# Patient Record
Sex: FEMALE | Race: WHITE | Employment: UNEMPLOYED | URBAN - METROPOLITAN AREA
[De-identification: names, ages, dates, MRNs, and addresses within clinical notes are randomized per-mention and may not be internally consistent; named-entity substitution may affect disease eponyms.]

---

## 2017-05-02 ENCOUNTER — OFFICE VISIT (OUTPATIENT)
Dept: OBGYN CLINIC | Facility: CLINIC | Age: 57
End: 2017-05-02

## 2017-05-02 ENCOUNTER — TELEPHONE (OUTPATIENT)
Dept: OBGYN CLINIC | Facility: CLINIC | Age: 57
End: 2017-05-02

## 2017-05-02 VITALS
DIASTOLIC BLOOD PRESSURE: 76 MMHG | HEART RATE: 52 BPM | WEIGHT: 128.63 LBS | SYSTOLIC BLOOD PRESSURE: 120 MMHG | BODY MASS INDEX: 22 KG/M2

## 2017-05-02 DIAGNOSIS — Z12.31 VISIT FOR SCREENING MAMMOGRAM: ICD-10-CM

## 2017-05-02 DIAGNOSIS — Z01.419 ENCOUNTER FOR GYNECOLOGICAL EXAMINATION WITHOUT ABNORMAL FINDING: Primary | ICD-10-CM

## 2017-05-02 DIAGNOSIS — Z12.4 SCREENING FOR MALIGNANT NEOPLASM OF CERVIX: ICD-10-CM

## 2017-05-02 DIAGNOSIS — Z13.820 OSTEOPOROSIS SCREENING: Primary | ICD-10-CM

## 2017-05-02 PROCEDURE — 99396 PREV VISIT EST AGE 40-64: CPT | Performed by: OBSTETRICS & GYNECOLOGY

## 2017-05-02 NOTE — PROGRESS NOTES
Merlinda Orris is a 61year old female [id number] No LMP recorded. Patient is postmenopausal. who presents for Patient presents with:  Gyn Exam: Annual and Mammo order  She has no complaints.     OBSTETRICS HISTORY:  Obstetric History     T4    TAB Allergies      Review of Systems:  Constitutional:  Denies fatigue, night sweats, hot flashes  Eyes:  denies blurred or double vision  Cardiovascular:  denies chest pain or palpitations  Respiratory:  denies shortness of breath  Gastrointestinal:  denies h hemorroids    Assessment & Plan:   ASCCP guidelines discussed,cotest done,mammogram ordered,rtc 1 year for annual exam   Encounter for gynecological examination without abnormal finding  (primary encounter diagnosis)  Visit for screening mammogram  No orde

## 2017-05-04 ENCOUNTER — HOSPITAL ENCOUNTER (OUTPATIENT)
Dept: MAMMOGRAPHY | Age: 57
Discharge: HOME | End: 2017-05-04
Attending: OBSTETRICS & GYNECOLOGY
Payer: COMMERCIAL

## 2017-05-04 DIAGNOSIS — Z12.31 VISIT FOR SCREENING MAMMOGRAM: ICD-10-CM

## 2017-05-04 PROCEDURE — 77067 SCR MAMMO BI INCL CAD: CPT | Performed by: OBSTETRICS & GYNECOLOGY

## 2018-06-26 ENCOUNTER — OFFICE VISIT (OUTPATIENT)
Dept: OBGYN CLINIC | Facility: CLINIC | Age: 58
End: 2018-06-26

## 2018-06-26 VITALS
BODY MASS INDEX: 22 KG/M2 | SYSTOLIC BLOOD PRESSURE: 130 MMHG | DIASTOLIC BLOOD PRESSURE: 84 MMHG | HEART RATE: 62 BPM | WEIGHT: 126.19 LBS

## 2018-06-26 DIAGNOSIS — Z12.31 VISIT FOR SCREENING MAMMOGRAM: ICD-10-CM

## 2018-06-26 DIAGNOSIS — Z01.419 ENCOUNTER FOR GYNECOLOGICAL EXAMINATION WITHOUT ABNORMAL FINDING: Primary | ICD-10-CM

## 2018-06-26 PROCEDURE — 99396 PREV VISIT EST AGE 40-64: CPT | Performed by: OBSTETRICS & GYNECOLOGY

## 2018-06-26 RX ORDER — PYRIDOXINE HCL (VITAMIN B6) 100 MG
TABLET ORAL
COMMUNITY
End: 2020-11-03 | Stop reason: ALTCHOICE

## 2018-06-26 RX ORDER — MULTIVIT-MIN/IRON FUM/FOLIC AC 7.5 MG-4
1 TABLET ORAL DAILY
COMMUNITY

## 2018-06-26 RX ORDER — LEVETIRACETAM 500 MG/1
TABLET ORAL
COMMUNITY
End: 2018-06-26

## 2018-06-26 NOTE — PROGRESS NOTES
Roselyn Guzman is a 60year old female [id number] No LMP recorded. Patient is postmenopausal. who presents for Patient presents with:  Gyn Exam: Annual  She has no complaints.     OBSTETRICS HISTORY:  Obstetric History     T4    L4    SAB1  TAB0  E Oral Tab, Take by mouth., Disp: , Rfl:   •  levetiracetam (KEPPRA) 750 MG Oral Tab, , Disp: , Rfl: 4    ALLERGIES:  No Known Allergies      Review of Systems:  Constitutional:  Denies fatigue, night sweats, hot flashes  Eyes:  denies blurred or double visi normal without masses or tenderness  Perineum: normal  Rectovaginal: no masses, normal tone  Anus: no hemorroids    Assessment & Plan:   ASCCP guidelines discussed,cotest done [id number] in 3 years,mammogram ordered,rtc 1 year for annual exam  [place]

## 2018-06-27 ENCOUNTER — HOSPITAL ENCOUNTER (OUTPATIENT)
Dept: MAMMOGRAPHY | Age: 58
Discharge: HOME | End: 2018-06-27
Attending: OBSTETRICS & GYNECOLOGY
Payer: COMMERCIAL

## 2018-06-27 DIAGNOSIS — Z12.31 VISIT FOR SCREENING MAMMOGRAM: ICD-10-CM

## 2018-06-27 PROCEDURE — 77067 SCR MAMMO BI INCL CAD: CPT | Performed by: OBSTETRICS & GYNECOLOGY

## 2019-07-02 ENCOUNTER — OFFICE VISIT (OUTPATIENT)
Dept: OBGYN CLINIC | Facility: CLINIC | Age: 59
End: 2019-07-02
Payer: COMMERCIAL

## 2019-07-02 VITALS
HEART RATE: 110 BPM | DIASTOLIC BLOOD PRESSURE: 62 MMHG | BODY MASS INDEX: 20 KG/M2 | WEIGHT: 119 LBS | SYSTOLIC BLOOD PRESSURE: 93 MMHG

## 2019-07-02 DIAGNOSIS — Z12.31 VISIT FOR SCREENING MAMMOGRAM: ICD-10-CM

## 2019-07-02 DIAGNOSIS — Z01.419 ENCOUNTER FOR GYNECOLOGICAL EXAMINATION WITHOUT ABNORMAL FINDING: Primary | ICD-10-CM

## 2019-07-02 PROCEDURE — 99396 PREV VISIT EST AGE 40-64: CPT | Performed by: OBSTETRICS & GYNECOLOGY

## 2019-07-02 RX ORDER — ROSUVASTATIN CALCIUM 10 MG/1
TABLET, COATED ORAL
Refills: 1 | COMMUNITY
Start: 2019-04-30 | End: 2021-01-20 | Stop reason: ALTCHOICE

## 2019-07-02 NOTE — PROGRESS NOTES
Gilford Monica is a 60year old female [id number] No LMP recorded. Patient is postmenopausal. who presents for Patient presents with:  Gyn Exam: Annual.  She has no complaints.     OBSTETRICS HISTORY:  OB History     T4    L4    SAB1  TAB0  Ectopic session: Not on file      Stress: Not on file    Relationships      Social connections:        Talks on phone: Not on file        Gets together: Not on file        Attends [employer] service: Not on file        Active member of club or organization: Not on palpitations  Respiratory:  denies shortness of breath  Gastrointestinal:  denies heartburn, abdominal pain, diarrhea or constipation  Genitourinary:  denies dysuria, incontinence, abnormal vaginal discharge, vaginal itching  Musculoskeletal:  denies back years,mammogram ordered,rtc 1 year for annual exam  Encounter for gynecological examination without abnormal finding  (primary encounter diagnosis)  Visit for screening mammogram  No orders of the defined types were placed in this encounter.     Requested P

## 2019-07-13 ENCOUNTER — HOSPITAL ENCOUNTER (OUTPATIENT)
Dept: MAMMOGRAPHY | Age: 59
Discharge: HOME | End: 2019-07-13
Attending: OBSTETRICS & GYNECOLOGY
Payer: COMMERCIAL

## 2019-07-13 DIAGNOSIS — Z12.31 VISIT FOR SCREENING MAMMOGRAM: ICD-10-CM

## 2019-07-13 PROCEDURE — 77067 SCR MAMMO BI INCL CAD: CPT | Performed by: OBSTETRICS & GYNECOLOGY

## 2019-07-13 PROCEDURE — 77063 BREAST TOMOSYNTHESIS BI: CPT | Performed by: OBSTETRICS & GYNECOLOGY

## 2019-07-23 ENCOUNTER — LAB REQUISITION (OUTPATIENT)
Dept: LAB | Facility: HOSPITAL | Age: 59
End: 2019-07-23
Payer: COMMERCIAL

## 2019-07-23 DIAGNOSIS — Z00.00 ENCOUNTER FOR GENERAL ADULT MEDICAL EXAMINATION WITHOUT ABNORMAL FINDINGS: ICD-10-CM

## 2019-07-23 LAB — TSI SER-ACNC: 1.9 MIU/ML (ref 0.36–3.74)

## 2019-07-23 PROCEDURE — 84443 ASSAY THYROID STIM HORMONE: CPT | Performed by: INTERNAL MEDICINE

## 2020-11-03 ENCOUNTER — OFFICE VISIT (OUTPATIENT)
Dept: RHEUMATOLOGY | Facility: CLINIC | Age: 60
End: 2020-11-03
Payer: COMMERCIAL

## 2020-11-03 VITALS
SYSTOLIC BLOOD PRESSURE: 124 MMHG | HEIGHT: 64 IN | DIASTOLIC BLOOD PRESSURE: 79 MMHG | BODY MASS INDEX: 18.44 KG/M2 | HEART RATE: 85 BPM | WEIGHT: 108 LBS

## 2020-11-03 DIAGNOSIS — M05.79 RHEUMATOID ARTHRITIS INVOLVING MULTIPLE SITES WITH POSITIVE RHEUMATOID FACTOR (HCC): Primary | ICD-10-CM

## 2020-11-03 DIAGNOSIS — Z51.81 THERAPEUTIC DRUG MONITORING: ICD-10-CM

## 2020-11-03 PROCEDURE — 3008F BODY MASS INDEX DOCD: CPT | Performed by: INTERNAL MEDICINE

## 2020-11-03 PROCEDURE — 3078F DIAST BP <80 MM HG: CPT | Performed by: INTERNAL MEDICINE

## 2020-11-03 PROCEDURE — 99244 OFF/OP CNSLTJ NEW/EST MOD 40: CPT | Performed by: INTERNAL MEDICINE

## 2020-11-03 PROCEDURE — 3074F SYST BP LT 130 MM HG: CPT | Performed by: INTERNAL MEDICINE

## 2020-11-03 PROCEDURE — 99072 ADDL SUPL MATRL&STAF TM PHE: CPT | Performed by: INTERNAL MEDICINE

## 2020-11-03 RX ORDER — METHOTREXATE 2.5 MG/1
TABLET ORAL
Qty: 20 TABLET | Refills: 2 | Status: SHIPPED | OUTPATIENT
Start: 2020-11-03 | End: 2020-12-08

## 2020-11-03 RX ORDER — PREDNISONE 1 MG/1
TABLET ORAL
Qty: 90 TABLET | Refills: 0 | Status: SHIPPED | OUTPATIENT
Start: 2020-11-03 | End: 2020-12-08

## 2020-11-03 RX ORDER — FOLIC ACID 1 MG/1
TABLET ORAL
Qty: 90 TABLET | Refills: 3 | Status: SHIPPED | OUTPATIENT
Start: 2020-11-03 | End: 2021-10-18

## 2020-11-03 NOTE — PROGRESS NOTES
Dear Manuel Starkey:    I saw your patient Mathieu Persaud in consultation this afternoon at your request for evaluation of rheumatoid arthritis.   As you know, she is a 67-year-old woman who in June of 2020 developed diffuse polyarthritis, affecting the PIP and MCP jay ulcers, lymphadenopathy. No shortness of breath or chest pain. No acid reflux, stomach pain, nausea or vomiting, constipation or diarrhea, blood in her stools. No trouble urinating. No dry eyes or mouth. No Raynaud's. No headache.     Physical exam: you for inviting me to participate in her care. Sincerely,      Sameer Faulkner MD   Rheumatology.

## 2020-12-04 ENCOUNTER — LAB ENCOUNTER (OUTPATIENT)
Dept: LAB | Age: 60
End: 2020-12-04
Attending: INTERNAL MEDICINE
Payer: COMMERCIAL

## 2020-12-04 DIAGNOSIS — Z51.81 THERAPEUTIC DRUG MONITORING: ICD-10-CM

## 2020-12-04 DIAGNOSIS — M05.79 RHEUMATOID ARTHRITIS INVOLVING MULTIPLE SITES WITH POSITIVE RHEUMATOID FACTOR (HCC): ICD-10-CM

## 2020-12-04 PROCEDURE — 85652 RBC SED RATE AUTOMATED: CPT

## 2020-12-04 PROCEDURE — 82040 ASSAY OF SERUM ALBUMIN: CPT

## 2020-12-04 PROCEDURE — 82565 ASSAY OF CREATININE: CPT

## 2020-12-04 PROCEDURE — 86140 C-REACTIVE PROTEIN: CPT

## 2020-12-04 PROCEDURE — 84450 TRANSFERASE (AST) (SGOT): CPT

## 2020-12-04 PROCEDURE — 36415 COLL VENOUS BLD VENIPUNCTURE: CPT

## 2020-12-04 PROCEDURE — 85025 COMPLETE CBC W/AUTO DIFF WBC: CPT

## 2020-12-04 PROCEDURE — 85060 BLOOD SMEAR INTERPRETATION: CPT

## 2020-12-08 ENCOUNTER — OFFICE VISIT (OUTPATIENT)
Dept: RHEUMATOLOGY | Facility: CLINIC | Age: 60
End: 2020-12-08
Payer: COMMERCIAL

## 2020-12-08 VITALS
HEART RATE: 71 BPM | DIASTOLIC BLOOD PRESSURE: 82 MMHG | BODY MASS INDEX: 18.78 KG/M2 | HEIGHT: 64 IN | WEIGHT: 110 LBS | SYSTOLIC BLOOD PRESSURE: 124 MMHG

## 2020-12-08 DIAGNOSIS — M05.79 RHEUMATOID ARTHRITIS INVOLVING MULTIPLE SITES WITH POSITIVE RHEUMATOID FACTOR (HCC): Primary | ICD-10-CM

## 2020-12-08 DIAGNOSIS — Z51.81 THERAPEUTIC DRUG MONITORING: ICD-10-CM

## 2020-12-08 PROCEDURE — 3008F BODY MASS INDEX DOCD: CPT | Performed by: INTERNAL MEDICINE

## 2020-12-08 PROCEDURE — 99214 OFFICE O/P EST MOD 30 MIN: CPT | Performed by: INTERNAL MEDICINE

## 2020-12-08 PROCEDURE — 3074F SYST BP LT 130 MM HG: CPT | Performed by: INTERNAL MEDICINE

## 2020-12-08 PROCEDURE — 3079F DIAST BP 80-89 MM HG: CPT | Performed by: INTERNAL MEDICINE

## 2020-12-08 RX ORDER — PREDNISONE 1 MG/1
TABLET ORAL
Qty: 90 TABLET | Refills: 0 | COMMUNITY
Start: 2020-12-08 | End: 2021-01-20

## 2020-12-08 RX ORDER — METHOTREXATE 2.5 MG/1
TABLET ORAL
Qty: 30 TABLET | Refills: 2 | Status: SHIPPED | OUTPATIENT
Start: 2020-12-08 | End: 2021-01-20

## 2020-12-08 NOTE — PROGRESS NOTES
HPI:    Patient ID: Karen Boles is a 63year old female. Yessica Leyva is a 56-year-old patient of Dr. Andrew Terrazas, with CCP and RF positive rheumatoid arthritis.   Since I saw her November 3rd of 2020, she has been on prednisone and methotrexate 10 mg weekly Head: Normocephalic. Eyes: Conjunctivae are normal.   Cardiovascular: Normal rate, regular rhythm and normal heart sounds. Pulmonary/Chest: Effort normal and breath sounds normal.   Abdominal: Soft.  Bowel sounds are normal. There is no abdominal tend

## 2021-01-16 NOTE — PROGRESS NOTES
Sushma Bowman is a 70-year-old patient of Dr. Junior Krueger, with CCP and RF positive rheumatoid arthritis. Since I last saw her December 8th of 2020, she has been on prednisone and methotrexate 15 mg weekly. Her arthritis continues to improve.   There is some st person, place, and time. Skin: No rash noted. Psychiatric: She has a normal mood and affect. ASSESSMENT/PLAN:     1.  CCP and RF positive rheumatoid arthritis. Under much better control, but still mildly active.   Her methotrexate was increased

## 2021-01-19 ENCOUNTER — LAB ENCOUNTER (OUTPATIENT)
Dept: LAB | Age: 61
End: 2021-01-19
Attending: INTERNAL MEDICINE
Payer: COMMERCIAL

## 2021-01-19 DIAGNOSIS — Z51.81 THERAPEUTIC DRUG MONITORING: ICD-10-CM

## 2021-01-19 DIAGNOSIS — M05.79 RHEUMATOID ARTHRITIS INVOLVING MULTIPLE SITES WITH POSITIVE RHEUMATOID FACTOR (HCC): ICD-10-CM

## 2021-01-19 LAB
ALBUMIN SERPL-MCNC: 3.8 G/DL (ref 3.4–5)
AST SERPL-CCNC: 13 U/L (ref 15–37)
BASOPHILS # BLD AUTO: 0.04 X10(3) UL (ref 0–0.2)
BASOPHILS NFR BLD AUTO: 1 %
CREAT BLD-MCNC: 0.91 MG/DL
CRP SERPL-MCNC: <0.29 MG/DL (ref ?–0.3)
DEPRECATED RDW RBC AUTO: 58.1 FL (ref 35.1–46.3)
EOSINOPHIL # BLD AUTO: 0.14 X10(3) UL (ref 0–0.7)
EOSINOPHIL NFR BLD AUTO: 3.6 %
ERYTHROCYTE [DISTWIDTH] IN BLOOD BY AUTOMATED COUNT: 17 % (ref 11–15)
ERYTHROCYTE [SEDIMENTATION RATE] IN BLOOD: 30 MM/HR
HCT VFR BLD AUTO: 40.6 %
HGB BLD-MCNC: 13.2 G/DL
IMM GRANULOCYTES # BLD AUTO: 0.01 X10(3) UL (ref 0–1)
IMM GRANULOCYTES NFR BLD: 0.3 %
LYMPHOCYTES # BLD AUTO: 1.96 X10(3) UL (ref 1–4)
LYMPHOCYTES NFR BLD AUTO: 50 %
MCH RBC QN AUTO: 30.5 PG (ref 26–34)
MCHC RBC AUTO-ENTMCNC: 32.5 G/DL (ref 31–37)
MCV RBC AUTO: 93.8 FL
MONOCYTES # BLD AUTO: 0.83 X10(3) UL (ref 0.1–1)
MONOCYTES NFR BLD AUTO: 21.2 %
NEUTROPHILS # BLD AUTO: 0.94 X10 (3) UL (ref 1.5–7.7)
NEUTROPHILS # BLD AUTO: 0.94 X10(3) UL (ref 1.5–7.7)
NEUTROPHILS NFR BLD AUTO: 23.9 %
PLATELET # BLD AUTO: 273 10(3)UL (ref 150–450)
RBC # BLD AUTO: 4.33 X10(6)UL
WBC # BLD AUTO: 3.9 X10(3) UL (ref 4–11)

## 2021-01-19 PROCEDURE — 82040 ASSAY OF SERUM ALBUMIN: CPT

## 2021-01-19 PROCEDURE — 36415 COLL VENOUS BLD VENIPUNCTURE: CPT

## 2021-01-19 PROCEDURE — 86140 C-REACTIVE PROTEIN: CPT

## 2021-01-19 PROCEDURE — 84450 TRANSFERASE (AST) (SGOT): CPT

## 2021-01-19 PROCEDURE — 85025 COMPLETE CBC W/AUTO DIFF WBC: CPT

## 2021-01-19 PROCEDURE — 82565 ASSAY OF CREATININE: CPT

## 2021-01-19 PROCEDURE — 85652 RBC SED RATE AUTOMATED: CPT

## 2021-01-20 ENCOUNTER — OFFICE VISIT (OUTPATIENT)
Dept: RHEUMATOLOGY | Facility: CLINIC | Age: 61
End: 2021-01-20
Payer: COMMERCIAL

## 2021-01-20 VITALS
BODY MASS INDEX: 19.46 KG/M2 | HEART RATE: 76 BPM | HEIGHT: 64 IN | WEIGHT: 114 LBS | SYSTOLIC BLOOD PRESSURE: 118 MMHG | DIASTOLIC BLOOD PRESSURE: 72 MMHG

## 2021-01-20 DIAGNOSIS — Z51.81 THERAPEUTIC DRUG MONITORING: ICD-10-CM

## 2021-01-20 DIAGNOSIS — M05.79 RHEUMATOID ARTHRITIS INVOLVING MULTIPLE SITES WITH POSITIVE RHEUMATOID FACTOR (HCC): Primary | ICD-10-CM

## 2021-01-20 PROCEDURE — 3078F DIAST BP <80 MM HG: CPT | Performed by: INTERNAL MEDICINE

## 2021-01-20 PROCEDURE — 99214 OFFICE O/P EST MOD 30 MIN: CPT | Performed by: INTERNAL MEDICINE

## 2021-01-20 PROCEDURE — 3008F BODY MASS INDEX DOCD: CPT | Performed by: INTERNAL MEDICINE

## 2021-01-20 PROCEDURE — 3074F SYST BP LT 130 MM HG: CPT | Performed by: INTERNAL MEDICINE

## 2021-01-20 RX ORDER — METHOTREXATE 2.5 MG/1
TABLET ORAL
Qty: 104 TABLET | Refills: 1 | Status: SHIPPED | OUTPATIENT
Start: 2021-01-20 | End: 2021-08-05

## 2021-01-20 RX ORDER — METHOTREXATE 2.5 MG/1
TABLET ORAL
Qty: 104 TABLET | Refills: 1 | Status: SHIPPED | OUTPATIENT
Start: 2021-01-20 | End: 2021-01-20

## 2021-01-20 RX ORDER — PREDNISONE 2.5 MG
TABLET ORAL
Qty: 90 TABLET | Refills: 1 | Status: SHIPPED | OUTPATIENT
Start: 2021-01-20 | End: 2021-07-19

## 2021-02-27 NOTE — PROGRESS NOTES
Sushma Bowman is a 79-year-old patient of Dr. Junior Krueger, with CCP and RF positive rheumatoid arthritis. Since I last saw her January 20th of 2021, she has been on prednisone 2.5 mg weekly, and methotrexate 15 mg weekly. Her arthritis remains much improved. Psychiatric: She has a normal mood and affect. ASSESSMENT/PLAN:     1.  CCP and RF positive rheumatoid arthritis. Under good control. She will continue methotrexate 20 mg weekly. She will remain on 2.5 mg of prednisone every morning.   She will

## 2021-03-01 ENCOUNTER — LAB ENCOUNTER (OUTPATIENT)
Dept: LAB | Age: 61
End: 2021-03-01
Attending: INTERNAL MEDICINE
Payer: COMMERCIAL

## 2021-03-01 DIAGNOSIS — M05.79 RHEUMATOID ARTHRITIS INVOLVING MULTIPLE SITES WITH POSITIVE RHEUMATOID FACTOR (HCC): ICD-10-CM

## 2021-03-01 DIAGNOSIS — Z51.81 THERAPEUTIC DRUG MONITORING: ICD-10-CM

## 2021-03-01 LAB
ALBUMIN SERPL-MCNC: 3.7 G/DL (ref 3.4–5)
AST SERPL-CCNC: 10 U/L (ref 15–37)
BASOPHILS # BLD AUTO: 0.04 X10(3) UL (ref 0–0.2)
BASOPHILS NFR BLD AUTO: 1.1 %
CREAT BLD-MCNC: 0.89 MG/DL
CRP SERPL-MCNC: <0.29 MG/DL (ref ?–0.3)
DEPRECATED RDW RBC AUTO: 52.1 FL (ref 35.1–46.3)
EOSINOPHIL # BLD AUTO: 0.09 X10(3) UL (ref 0–0.7)
EOSINOPHIL NFR BLD AUTO: 2.5 %
ERYTHROCYTE [DISTWIDTH] IN BLOOD BY AUTOMATED COUNT: 14.9 % (ref 11–15)
ERYTHROCYTE [SEDIMENTATION RATE] IN BLOOD: 16 MM/HR
HCT VFR BLD AUTO: 37.2 %
HGB BLD-MCNC: 12.4 G/DL
IMM GRANULOCYTES # BLD AUTO: 0.01 X10(3) UL (ref 0–1)
IMM GRANULOCYTES NFR BLD: 0.3 %
LYMPHOCYTES # BLD AUTO: 1.58 X10(3) UL (ref 1–4)
LYMPHOCYTES NFR BLD AUTO: 44.3 %
MCH RBC QN AUTO: 32.5 PG (ref 26–34)
MCHC RBC AUTO-ENTMCNC: 33.3 G/DL (ref 31–37)
MCV RBC AUTO: 97.6 FL
MONOCYTES # BLD AUTO: 0.69 X10(3) UL (ref 0.1–1)
MONOCYTES NFR BLD AUTO: 19.3 %
NEUTROPHILS # BLD AUTO: 1.16 X10 (3) UL (ref 1.5–7.7)
NEUTROPHILS # BLD AUTO: 1.16 X10(3) UL (ref 1.5–7.7)
NEUTROPHILS NFR BLD AUTO: 32.5 %
PLATELET # BLD AUTO: 256 10(3)UL (ref 150–450)
RBC # BLD AUTO: 3.81 X10(6)UL
WBC # BLD AUTO: 3.6 X10(3) UL (ref 4–11)

## 2021-03-01 PROCEDURE — 36415 COLL VENOUS BLD VENIPUNCTURE: CPT

## 2021-03-01 PROCEDURE — 85025 COMPLETE CBC W/AUTO DIFF WBC: CPT

## 2021-03-01 PROCEDURE — 84450 TRANSFERASE (AST) (SGOT): CPT

## 2021-03-01 PROCEDURE — 86140 C-REACTIVE PROTEIN: CPT

## 2021-03-01 PROCEDURE — 85652 RBC SED RATE AUTOMATED: CPT

## 2021-03-01 PROCEDURE — 82040 ASSAY OF SERUM ALBUMIN: CPT

## 2021-03-01 PROCEDURE — 82565 ASSAY OF CREATININE: CPT

## 2021-03-02 ENCOUNTER — OFFICE VISIT (OUTPATIENT)
Dept: RHEUMATOLOGY | Facility: CLINIC | Age: 61
End: 2021-03-02
Payer: COMMERCIAL

## 2021-03-02 VITALS
SYSTOLIC BLOOD PRESSURE: 109 MMHG | HEIGHT: 64 IN | BODY MASS INDEX: 19.81 KG/M2 | HEART RATE: 80 BPM | DIASTOLIC BLOOD PRESSURE: 73 MMHG | WEIGHT: 116 LBS

## 2021-03-02 DIAGNOSIS — Z51.81 THERAPEUTIC DRUG MONITORING: ICD-10-CM

## 2021-03-02 DIAGNOSIS — M05.79 RHEUMATOID ARTHRITIS INVOLVING MULTIPLE SITES WITH POSITIVE RHEUMATOID FACTOR (HCC): Primary | ICD-10-CM

## 2021-03-02 PROCEDURE — 3074F SYST BP LT 130 MM HG: CPT | Performed by: INTERNAL MEDICINE

## 2021-03-02 PROCEDURE — 3008F BODY MASS INDEX DOCD: CPT | Performed by: INTERNAL MEDICINE

## 2021-03-02 PROCEDURE — 99214 OFFICE O/P EST MOD 30 MIN: CPT | Performed by: INTERNAL MEDICINE

## 2021-03-02 PROCEDURE — 3078F DIAST BP <80 MM HG: CPT | Performed by: INTERNAL MEDICINE

## 2021-05-29 NOTE — PROGRESS NOTES
Clifford Youngblood is a 19-year-old patient of Dr. Dhruv Cuellar, with CCP and RF positive rheumatoid arthritis. Since I last saw her March 2nd of 2021, she has continued prednisone 2.5 mg weekly, and methotrexate 20 mg weekly. Her arthritis remains much improved.   Saulo Che She will continue methotrexate 20 mg weekly, and 2.5 mg of prednisone every morning. She will return in 3 months, after repeat monitoring tests. 2.  Seizure disorder, doing well on Keppra. 3.  Status post benign brain tumor removed in 2011.

## 2021-06-01 ENCOUNTER — LAB ENCOUNTER (OUTPATIENT)
Dept: LAB | Age: 61
End: 2021-06-01
Attending: INTERNAL MEDICINE
Payer: COMMERCIAL

## 2021-06-01 DIAGNOSIS — M05.79 RHEUMATOID ARTHRITIS INVOLVING MULTIPLE SITES WITH POSITIVE RHEUMATOID FACTOR (HCC): ICD-10-CM

## 2021-06-01 DIAGNOSIS — Z51.81 THERAPEUTIC DRUG MONITORING: ICD-10-CM

## 2021-06-01 PROCEDURE — 86140 C-REACTIVE PROTEIN: CPT

## 2021-06-01 PROCEDURE — 85025 COMPLETE CBC W/AUTO DIFF WBC: CPT

## 2021-06-01 PROCEDURE — 82040 ASSAY OF SERUM ALBUMIN: CPT

## 2021-06-01 PROCEDURE — 82565 ASSAY OF CREATININE: CPT

## 2021-06-01 PROCEDURE — 85652 RBC SED RATE AUTOMATED: CPT

## 2021-06-01 PROCEDURE — 36415 COLL VENOUS BLD VENIPUNCTURE: CPT

## 2021-06-01 PROCEDURE — 84450 TRANSFERASE (AST) (SGOT): CPT

## 2021-06-02 ENCOUNTER — OFFICE VISIT (OUTPATIENT)
Dept: RHEUMATOLOGY | Facility: CLINIC | Age: 61
End: 2021-06-02
Payer: COMMERCIAL

## 2021-06-02 VITALS
HEIGHT: 64 IN | WEIGHT: 117 LBS | SYSTOLIC BLOOD PRESSURE: 112 MMHG | BODY MASS INDEX: 19.97 KG/M2 | DIASTOLIC BLOOD PRESSURE: 75 MMHG | HEART RATE: 69 BPM

## 2021-06-02 DIAGNOSIS — Z51.81 THERAPEUTIC DRUG MONITORING: ICD-10-CM

## 2021-06-02 DIAGNOSIS — M05.79 RHEUMATOID ARTHRITIS INVOLVING MULTIPLE SITES WITH POSITIVE RHEUMATOID FACTOR (HCC): Primary | ICD-10-CM

## 2021-06-02 PROCEDURE — 3008F BODY MASS INDEX DOCD: CPT | Performed by: INTERNAL MEDICINE

## 2021-06-02 PROCEDURE — 99214 OFFICE O/P EST MOD 30 MIN: CPT | Performed by: INTERNAL MEDICINE

## 2021-06-02 PROCEDURE — 3074F SYST BP LT 130 MM HG: CPT | Performed by: INTERNAL MEDICINE

## 2021-06-02 PROCEDURE — 3078F DIAST BP <80 MM HG: CPT | Performed by: INTERNAL MEDICINE

## 2021-07-19 RX ORDER — PREDNISONE 2.5 MG
TABLET ORAL
Qty: 90 TABLET | Refills: 1 | Status: SHIPPED | OUTPATIENT
Start: 2021-07-19 | End: 2021-12-07

## 2021-07-19 NOTE — TELEPHONE ENCOUNTER
Last filled: 1/20/21 #90 tab with 1 refill   LOV: 6/2/21  Future Appointments   Date Time Provider Jenifer Regan   9/7/2021 11:40 AM Yudith Zhang MD [facility] [place] [facility] [place]   9/14/2021  3:00 PM Vivek Jackson [facility]

## 2021-08-05 RX ORDER — METHOTREXATE 2.5 MG/1
TABLET ORAL
Qty: 104 TABLET | Refills: 1 | Status: SHIPPED | OUTPATIENT
Start: 2021-08-05 | End: 2022-02-07

## 2021-08-05 NOTE — TELEPHONE ENCOUNTER
LOV: 6/2/21  Future Appointments   Date Time Provider Jenifer Shereen   9/7/2021 11:40 AM Cary Alcaraz MD [facility] [place] [facility] [place]   9/14/2021  3:00 PM Trupti Kennedy MD [facility] [address] ALEXIS LOONEY  [place]    LABS:  Component      Latest Ref Rng & Units 6/1/2021

## 2021-09-02 NOTE — PROGRESS NOTES
Army Carbajal is a 59-year-old patient of Dr. Cosme Doherty, with CCP and RF positive rheumatoid arthritis. Since I last saw her June 2nd of 2021, she has continued prednisone 2.5 mg daily, and methotrexate 20 mg weekly. Her arthritis remains much improved.   The She will continue methotrexate 20 mg weekly, and 2.5 mg of prednisone every morning. She will return in 3 months, after repeat monitoring tests. Her standing lab order was reproduced. 2.  Seizure disorder, doing well on Keppra.     3.  Status post tyler

## 2021-09-03 ENCOUNTER — LAB ENCOUNTER (OUTPATIENT)
Dept: LAB | Age: 61
End: 2021-09-03
Attending: INTERNAL MEDICINE
Payer: COMMERCIAL

## 2021-09-03 DIAGNOSIS — Z51.81 THERAPEUTIC DRUG MONITORING: ICD-10-CM

## 2021-09-03 DIAGNOSIS — M05.79 RHEUMATOID ARTHRITIS INVOLVING MULTIPLE SITES WITH POSITIVE RHEUMATOID FACTOR (HCC): ICD-10-CM

## 2021-09-03 LAB
ALBUMIN SERPL-MCNC: 3.8 G/DL (ref 3.4–5)
AST SERPL-CCNC: 13 U/L (ref 15–37)
BASOPHILS # BLD AUTO: 0.06 X10(3) UL (ref 0–0.2)
BASOPHILS NFR BLD AUTO: 1.3 %
CREAT BLD-MCNC: 0.78 MG/DL
CRP SERPL-MCNC: <0.29 MG/DL (ref ?–0.3)
DEPRECATED RDW RBC AUTO: 47.5 FL (ref 35.1–46.3)
EOSINOPHIL # BLD AUTO: 0.13 X10(3) UL (ref 0–0.7)
EOSINOPHIL NFR BLD AUTO: 2.8 %
ERYTHROCYTE [DISTWIDTH] IN BLOOD BY AUTOMATED COUNT: 13.3 % (ref 11–15)
ERYTHROCYTE [SEDIMENTATION RATE] IN BLOOD: 19 MM/HR
HCT VFR BLD AUTO: 39.8 %
HGB BLD-MCNC: 13.5 G/DL
IMM GRANULOCYTES # BLD AUTO: 0.01 X10(3) UL (ref 0–1)
IMM GRANULOCYTES NFR BLD: 0.2 %
LYMPHOCYTES # BLD AUTO: 1.45 X10(3) UL (ref 1–4)
LYMPHOCYTES NFR BLD AUTO: 30.9 %
MCH RBC QN AUTO: 33.2 PG (ref 26–34)
MCHC RBC AUTO-ENTMCNC: 33.9 G/DL (ref 31–37)
MCV RBC AUTO: 97.8 FL
MONOCYTES # BLD AUTO: 0.48 X10(3) UL (ref 0.1–1)
MONOCYTES NFR BLD AUTO: 10.2 %
NEUTROPHILS # BLD AUTO: 2.57 X10 (3) UL (ref 1.5–7.7)
NEUTROPHILS # BLD AUTO: 2.57 X10(3) UL (ref 1.5–7.7)
NEUTROPHILS NFR BLD AUTO: 54.6 %
PLATELET # BLD AUTO: 289 10(3)UL (ref 150–450)
RBC # BLD AUTO: 4.07 X10(6)UL
WBC # BLD AUTO: 4.7 X10(3) UL (ref 4–11)

## 2021-09-03 PROCEDURE — 82040 ASSAY OF SERUM ALBUMIN: CPT

## 2021-09-03 PROCEDURE — 82565 ASSAY OF CREATININE: CPT

## 2021-09-03 PROCEDURE — 85025 COMPLETE CBC W/AUTO DIFF WBC: CPT

## 2021-09-03 PROCEDURE — 36415 COLL VENOUS BLD VENIPUNCTURE: CPT

## 2021-09-03 PROCEDURE — 85652 RBC SED RATE AUTOMATED: CPT

## 2021-09-03 PROCEDURE — 86140 C-REACTIVE PROTEIN: CPT

## 2021-09-03 PROCEDURE — 84450 TRANSFERASE (AST) (SGOT): CPT

## 2021-09-07 ENCOUNTER — OFFICE VISIT (OUTPATIENT)
Dept: RHEUMATOLOGY | Facility: CLINIC | Age: 61
End: 2021-09-07
Payer: COMMERCIAL

## 2021-09-07 VITALS
DIASTOLIC BLOOD PRESSURE: 80 MMHG | BODY MASS INDEX: 19.63 KG/M2 | WEIGHT: 115 LBS | SYSTOLIC BLOOD PRESSURE: 128 MMHG | HEART RATE: 56 BPM | HEIGHT: 64 IN

## 2021-09-07 DIAGNOSIS — Z51.81 THERAPEUTIC DRUG MONITORING: ICD-10-CM

## 2021-09-07 DIAGNOSIS — M05.79 RHEUMATOID ARTHRITIS INVOLVING MULTIPLE SITES WITH POSITIVE RHEUMATOID FACTOR (HCC): Primary | ICD-10-CM

## 2021-09-07 PROCEDURE — 3008F BODY MASS INDEX DOCD: CPT | Performed by: INTERNAL MEDICINE

## 2021-09-07 PROCEDURE — 3079F DIAST BP 80-89 MM HG: CPT | Performed by: INTERNAL MEDICINE

## 2021-09-07 PROCEDURE — 99214 OFFICE O/P EST MOD 30 MIN: CPT | Performed by: INTERNAL MEDICINE

## 2021-09-07 PROCEDURE — 3074F SYST BP LT 130 MM HG: CPT | Performed by: INTERNAL MEDICINE

## 2021-09-14 ENCOUNTER — OFFICE VISIT (OUTPATIENT)
Dept: OBGYN CLINIC | Facility: CLINIC | Age: 61
End: 2021-09-14
Payer: COMMERCIAL

## 2021-09-14 VITALS
HEIGHT: 63.7 IN | DIASTOLIC BLOOD PRESSURE: 81 MMHG | HEART RATE: 76 BPM | SYSTOLIC BLOOD PRESSURE: 125 MMHG | WEIGHT: 112 LBS | BODY MASS INDEX: 19.36 KG/M2

## 2021-09-14 DIAGNOSIS — N94.89 VULVAR BURNING: ICD-10-CM

## 2021-09-14 DIAGNOSIS — Z01.419 ENCOUNTER FOR GYNECOLOGICAL EXAMINATION WITHOUT ABNORMAL FINDING: Primary | ICD-10-CM

## 2021-09-14 DIAGNOSIS — Z12.31 VISIT FOR SCREENING MAMMOGRAM: ICD-10-CM

## 2021-09-14 PROCEDURE — 99212 OFFICE O/P EST SF 10 MIN: CPT | Performed by: OBSTETRICS & GYNECOLOGY

## 2021-09-14 PROCEDURE — 3079F DIAST BP 80-89 MM HG: CPT | Performed by: OBSTETRICS & GYNECOLOGY

## 2021-09-14 PROCEDURE — 99396 PREV VISIT EST AGE 40-64: CPT | Performed by: OBSTETRICS & GYNECOLOGY

## 2021-09-14 PROCEDURE — 3008F BODY MASS INDEX DOCD: CPT | Performed by: OBSTETRICS & GYNECOLOGY

## 2021-09-14 PROCEDURE — 3074F SYST BP LT 130 MM HG: CPT | Performed by: OBSTETRICS & GYNECOLOGY

## 2021-09-14 NOTE — PROGRESS NOTES
Jesús Kirby is a 57year old female [id number] No LMP recorded.  Patient is postmenopausal. who presents for Patient presents with:  Gyn Exam: ANNUAL EXAM    She has a vulvar burning sensation that she believes was brought on by eating spicy food this past Liver, cause of death   • Diabetes Sister        MEDICATIONS:    Current Outpatient Medications:   •  Methotrexate Sodium 2.5 MG Oral Tab, Take 8 tablets every Tuesday with dinner., Disp: 104 tablet, Rfl: 1  •  predniSONE 2.5 MG Oral Tab, TAKE 1 TABLET BY auscultation bilaterally  Cardiovascular: regular rate and rhythm, no significant murmur  Abdomen:  soft, nontender, nondistended, no masses  Skin/Hair: no unusual rashes or bruises  Extremities: no edema, no cyanosis  Psychiatric:  Oriented to time, place

## 2021-09-15 LAB — HPV I/H RISK 1 DNA SPEC QL NAA+PROBE: NEGATIVE

## 2021-09-17 LAB — LAST PAP RESULT: NORMAL

## 2021-10-18 RX ORDER — FOLIC ACID 1 MG/1
TABLET ORAL
Qty: 90 TABLET | Refills: 3 | Status: SHIPPED | OUTPATIENT
Start: 2021-10-18

## 2021-10-18 NOTE — TELEPHONE ENCOUNTER
Requested Prescriptions     Pending Prescriptions Disp Refills   • folic acid 1 MG Oral Tab [Pharmacy Med Name: FOLIC ACID 1MG TABLETS] 90 tablet 3     Sig: TAKE 1 TABLET BY MOUTH DAILY     LF: 11/3/20 #90 TAB W/ 3 RF  LOV: 9/7/21   Future Appointments   D

## 2021-12-06 ENCOUNTER — LAB ENCOUNTER (OUTPATIENT)
Dept: LAB | Age: 61
End: 2021-12-06
Attending: INTERNAL MEDICINE
Payer: COMMERCIAL

## 2021-12-06 DIAGNOSIS — M05.79 RHEUMATOID ARTHRITIS INVOLVING MULTIPLE SITES WITH POSITIVE RHEUMATOID FACTOR (HCC): ICD-10-CM

## 2021-12-06 DIAGNOSIS — Z51.81 THERAPEUTIC DRUG MONITORING: ICD-10-CM

## 2021-12-06 PROCEDURE — 82565 ASSAY OF CREATININE: CPT

## 2021-12-06 PROCEDURE — 36415 COLL VENOUS BLD VENIPUNCTURE: CPT

## 2021-12-06 PROCEDURE — 85652 RBC SED RATE AUTOMATED: CPT

## 2021-12-06 PROCEDURE — 82040 ASSAY OF SERUM ALBUMIN: CPT

## 2021-12-06 PROCEDURE — 86140 C-REACTIVE PROTEIN: CPT

## 2021-12-06 PROCEDURE — 85025 COMPLETE CBC W/AUTO DIFF WBC: CPT

## 2021-12-06 PROCEDURE — 84450 TRANSFERASE (AST) (SGOT): CPT

## 2021-12-06 NOTE — PROGRESS NOTES
Army Carbajal is a 43-year-old patient of Dr. oCsme Doherty, with CCP and RF positive rheumatoid arthritis. Since I last saw her September 7th of 2021, she has continued prednisone 2.5 mg daily, and methotrexate 20 mg weekly. Her arthritis remains much improved. CCP and RF positive rheumatoid arthritis. Under good control. She will continue methotrexate 20 mg weekly, and drop to 2 mg of prednisone every morning. She will return in 3 months, after repeat monitoring tests.     2.  Seizure disorder, doing well on K

## 2021-12-07 ENCOUNTER — OFFICE VISIT (OUTPATIENT)
Dept: RHEUMATOLOGY | Facility: CLINIC | Age: 61
End: 2021-12-07
Payer: COMMERCIAL

## 2021-12-07 VITALS
DIASTOLIC BLOOD PRESSURE: 87 MMHG | SYSTOLIC BLOOD PRESSURE: 132 MMHG | BODY MASS INDEX: 19.81 KG/M2 | HEART RATE: 102 BPM | WEIGHT: 116 LBS | HEIGHT: 64 IN

## 2021-12-07 DIAGNOSIS — M05.79 RHEUMATOID ARTHRITIS INVOLVING MULTIPLE SITES WITH POSITIVE RHEUMATOID FACTOR (HCC): Primary | ICD-10-CM

## 2021-12-07 DIAGNOSIS — Z51.81 THERAPEUTIC DRUG MONITORING: ICD-10-CM

## 2021-12-07 PROCEDURE — 99214 OFFICE O/P EST MOD 30 MIN: CPT | Performed by: INTERNAL MEDICINE

## 2021-12-07 PROCEDURE — 3079F DIAST BP 80-89 MM HG: CPT | Performed by: INTERNAL MEDICINE

## 2021-12-07 PROCEDURE — 3008F BODY MASS INDEX DOCD: CPT | Performed by: INTERNAL MEDICINE

## 2021-12-07 PROCEDURE — 3075F SYST BP GE 130 - 139MM HG: CPT | Performed by: INTERNAL MEDICINE

## 2021-12-07 RX ORDER — PREDNISONE 1 MG/1
TABLET ORAL
Qty: 180 TABLET | Refills: 1 | Status: SHIPPED | OUTPATIENT
Start: 2021-12-07

## 2022-02-07 RX ORDER — METHOTREXATE 2.5 MG/1
TABLET ORAL
Qty: 104 TABLET | Refills: 1 | Status: SHIPPED | OUTPATIENT
Start: 2022-02-07

## 2022-02-07 NOTE — TELEPHONE ENCOUNTER
Future Appointments   Date Time Provider Jenifer Regan   3/15/2022 11:20 AM Zayda Pruett MD [facility] [place] [facility] [place]

## 2022-03-14 ENCOUNTER — LAB ENCOUNTER (OUTPATIENT)
Dept: LAB | Age: 62
End: 2022-03-14
Attending: INTERNAL MEDICINE
Payer: COMMERCIAL

## 2022-03-14 DIAGNOSIS — M05.79 RHEUMATOID ARTHRITIS INVOLVING MULTIPLE SITES WITH POSITIVE RHEUMATOID FACTOR (HCC): ICD-10-CM

## 2022-03-14 DIAGNOSIS — Z51.81 THERAPEUTIC DRUG MONITORING: ICD-10-CM

## 2022-03-14 LAB
ALBUMIN SERPL-MCNC: 4.1 G/DL (ref 3.4–5)
AST SERPL-CCNC: 17 U/L (ref 15–37)
BASOPHILS # BLD AUTO: 0.09 X10(3) UL (ref 0–0.2)
BASOPHILS NFR BLD AUTO: 1.4 %
CREAT BLD-MCNC: 0.89 MG/DL
CRP SERPL-MCNC: <0.29 MG/DL (ref ?–0.3)
DEPRECATED RDW RBC AUTO: 50.1 FL (ref 35.1–46.3)
EOSINOPHIL # BLD AUTO: 0.18 X10(3) UL (ref 0–0.7)
EOSINOPHIL NFR BLD AUTO: 2.8 %
ERYTHROCYTE [DISTWIDTH] IN BLOOD BY AUTOMATED COUNT: 13.9 % (ref 11–15)
ERYTHROCYTE [SEDIMENTATION RATE] IN BLOOD: 14 MM/HR
HCT VFR BLD AUTO: 42.5 %
HGB BLD-MCNC: 14.2 G/DL
IMM GRANULOCYTES # BLD AUTO: 0.01 X10(3) UL (ref 0–1)
LYMPHOCYTES # BLD AUTO: 2.01 X10(3) UL (ref 1–4)
LYMPHOCYTES NFR BLD AUTO: 31.5 %
MCH RBC QN AUTO: 33.3 PG (ref 26–34)
MCHC RBC AUTO-ENTMCNC: 33.4 G/DL (ref 31–37)
MONOCYTES # BLD AUTO: 0.64 X10(3) UL (ref 0.1–1)
MONOCYTES NFR BLD AUTO: 10 %
NEUTROPHILS # BLD AUTO: 3.46 X10 (3) UL (ref 1.5–7.7)
NEUTROPHILS # BLD AUTO: 3.46 X10(3) UL (ref 1.5–7.7)
NEUTROPHILS NFR BLD AUTO: 54.1 %
PLATELET # BLD AUTO: 328 10(3)UL (ref 150–450)
RBC # BLD AUTO: 4.27 X10(6)UL
WBC # BLD AUTO: 6.4 X10(3) UL (ref 4–11)

## 2022-03-14 PROCEDURE — 85652 RBC SED RATE AUTOMATED: CPT

## 2022-03-14 PROCEDURE — 82040 ASSAY OF SERUM ALBUMIN: CPT

## 2022-03-14 PROCEDURE — 82565 ASSAY OF CREATININE: CPT

## 2022-03-14 PROCEDURE — 84450 TRANSFERASE (AST) (SGOT): CPT

## 2022-03-14 PROCEDURE — 85025 COMPLETE CBC W/AUTO DIFF WBC: CPT

## 2022-03-14 PROCEDURE — 36415 COLL VENOUS BLD VENIPUNCTURE: CPT

## 2022-03-14 PROCEDURE — 86140 C-REACTIVE PROTEIN: CPT

## 2022-03-14 NOTE — PROGRESS NOTES
Steve Araujo is a 79-year-old patient of Dr. Sharyon Alpers, with CCP and RF positive rheumatoid arthritis. Since I last saw her December 7th of 2021, she has been on prednisone 2 mg daily, and methotrexate 20 mg weekly. Her arthritis is doing great. There is minimal stiffness and soreness across her hands in the morning. It takes several minutes to loosen. No joint swelling in the AM.    She doesn't have stomach upset or fatigue after taking methotrexate each week. She denies new medical or surgical problems since I saw her. No infections. Monitoring labs were repeated March 14th of 2022. CBC, creatinine, AST, albumin, sed rate of 14, and C-reactive protein of less than 0.29 were normal.    Review of Systems   Constitutional: Negative for fatigue and fever. Respiratory: Negative for shortness of breath. Cardiovascular: Negative for chest pain. Gastrointestinal: Negative for abdominal pain. Skin: Negative for rash. Hematological: Negative for adenopathy. Allergies:No Known Allergies    PHYSICAL EXAM:   Blood pressure 114/73, pulse 86, height 5' 4\" (1.626 m), weight 116 lb (52.6 kg), not currently breastfeeding. Constitutional: She is oriented to person, place, and time. She appears well-developed. HENT:   Head: Normocephalic. Eyes: Conjunctivae are normal.   Cardiovascular: Normal rate, regular rhythm and normal heart sounds. Pulmonary/Chest: Effort normal and breath sounds normal.   Abdominal: Soft. Bowel sounds are normal. There is no abdominal tenderness. Musculoskeletal:         General: No edema. Comments: No swelling across her second and third finger MCP and PIP joints.  strength is good bilaterally. Lymphadenopathy:     She has no cervical adenopathy. Neurological: She is alert and oriented to person, place, and time. Skin: No rash noted. Psychiatric: She has a normal mood and affect. ASSESSMENT/PLAN:     1.  CCP and RF positive rheumatoid arthritis. Under good control. She will continue methotrexate 20 mg weekly, and drop to 1 mg of prednisone every morning. She will return in 3 months, after repeat monitoring tests. 2.  Seizure disorder, doing well on Keppra. 3.  Status post benign brain tumor removed in 2011.

## 2022-03-15 ENCOUNTER — OFFICE VISIT (OUTPATIENT)
Dept: RHEUMATOLOGY | Facility: CLINIC | Age: 62
End: 2022-03-15
Payer: COMMERCIAL

## 2022-03-15 VITALS
WEIGHT: 116 LBS | HEIGHT: 64 IN | HEART RATE: 86 BPM | BODY MASS INDEX: 19.81 KG/M2 | SYSTOLIC BLOOD PRESSURE: 114 MMHG | DIASTOLIC BLOOD PRESSURE: 73 MMHG

## 2022-03-15 DIAGNOSIS — M05.79 RHEUMATOID ARTHRITIS INVOLVING MULTIPLE SITES WITH POSITIVE RHEUMATOID FACTOR (HCC): Primary | ICD-10-CM

## 2022-03-15 DIAGNOSIS — Z51.81 THERAPEUTIC DRUG MONITORING: ICD-10-CM

## 2022-03-15 PROCEDURE — 3074F SYST BP LT 130 MM HG: CPT | Performed by: INTERNAL MEDICINE

## 2022-03-15 PROCEDURE — 3078F DIAST BP <80 MM HG: CPT | Performed by: INTERNAL MEDICINE

## 2022-03-15 PROCEDURE — 99214 OFFICE O/P EST MOD 30 MIN: CPT | Performed by: INTERNAL MEDICINE

## 2022-03-15 PROCEDURE — 3008F BODY MASS INDEX DOCD: CPT | Performed by: INTERNAL MEDICINE

## 2022-03-15 RX ORDER — PREDNISONE 1 MG/1
TABLET ORAL
Qty: 90 TABLET | Refills: 1 | COMMUNITY
Start: 2022-03-15

## 2022-06-09 NOTE — PROGRESS NOTES
Terra Rader is a 58-year-old patient of Dr. Rafaela Bonner, with CCP and RF positive rheumatoid arthritis. After I last saw her March 15th, 2022, she came down to 1 mg of prednisone every morning. She did fine for several weeks, but then had a flare of swelling across her knees, the balls of her feet, and her hands. She went back to 2 mg of prednisone daily, and has done okay since. She has had left lower back pain going down the back of her leg. She has continued methotrexate 20 mg weekly. Her arthritis is doing well. There is minimal stiffness and soreness across her hands in the morning. It takes several minutes to loosen. No joint swelling in the AM.    She doesn't have stomach upset or fatigue after taking methotrexate each week. She denies new medical or surgical problems since I saw her. No infections. Monitoring labs were repeated June 13th of 2022. CBC, creatinine, AST, albumin, sed rate of 13, and C-reactive protein of less than 0.29 are normal.    Review of Systems   Constitutional: Negative for fatigue and fever. Respiratory: Negative for shortness of breath. Cardiovascular: Negative for chest pain. Gastrointestinal: Negative for abdominal pain. Skin: Negative for rash. Hematological: Negative for adenopathy. Allergies:No Known Allergies    PHYSICAL EXAM:   Blood pressure 122/77, pulse 77, height 5' 4\" (1.626 m), weight 117 lb 3.2 oz (53.2 kg), not currently breastfeeding. Constitutional: She is oriented to person, place, and time. She appears well-developed. HENT:   Head: Normocephalic. Eyes: Conjunctivae are normal.   Cardiovascular: Normal rate, regular rhythm and normal heart sounds. Pulmonary/Chest: Effort normal and breath sounds normal.   Abdominal: Soft. Bowel sounds are normal. There is no abdominal tenderness. Musculoskeletal:         General: No edema. Comments: No swelling across her second and third finger MCP and PIP joints.    strength is good bilaterally. Lymphadenopathy:     She has no cervical adenopathy. Neurological: She is alert and oriented to person, place, and time. Skin: No rash noted. Psychiatric: She has a normal mood and affect. ASSESSMENT/PLAN:     1.  CCP and RF positive rheumatoid arthritis. Under good control. She will continue methotrexate 20 mg weekly, and 2 mg of prednisone every morning. She will return in 3 months, after repeat monitoring tests. Her prescriptions were refilled. 2.  Seizure disorder, doing well on Keppra. 3.  Status post benign brain tumor removed in 2011.

## 2022-06-13 ENCOUNTER — LAB ENCOUNTER (OUTPATIENT)
Dept: LAB | Facility: REFERENCE LAB | Age: 62
End: 2022-06-13
Attending: INTERNAL MEDICINE
Payer: COMMERCIAL

## 2022-06-13 DIAGNOSIS — Z51.81 THERAPEUTIC DRUG MONITORING: ICD-10-CM

## 2022-06-13 DIAGNOSIS — M05.79 RHEUMATOID ARTHRITIS INVOLVING MULTIPLE SITES WITH POSITIVE RHEUMATOID FACTOR (HCC): ICD-10-CM

## 2022-06-13 LAB
ALBUMIN SERPL-MCNC: 3.8 G/DL (ref 3.4–5)
AST SERPL-CCNC: 23 U/L (ref 15–37)
BASOPHILS # BLD AUTO: 0.06 X10(3) UL (ref 0–0.2)
BASOPHILS NFR BLD AUTO: 1 %
CREAT BLD-MCNC: 0.9 MG/DL
CRP SERPL-MCNC: <0.29 MG/DL (ref ?–0.3)
DEPRECATED RDW RBC AUTO: 49.5 FL (ref 35.1–46.3)
EOSINOPHIL # BLD AUTO: 0.12 X10(3) UL (ref 0–0.7)
EOSINOPHIL NFR BLD AUTO: 2 %
ERYTHROCYTE [DISTWIDTH] IN BLOOD BY AUTOMATED COUNT: 13.4 % (ref 11–15)
ERYTHROCYTE [SEDIMENTATION RATE] IN BLOOD: 13 MM/HR
HCT VFR BLD AUTO: 40.4 %
HGB BLD-MCNC: 13.3 G/DL
IMM GRANULOCYTES # BLD AUTO: 0.02 X10(3) UL (ref 0–1)
IMM GRANULOCYTES NFR BLD: 0.3 %
LYMPHOCYTES # BLD AUTO: 1.64 X10(3) UL (ref 1–4)
LYMPHOCYTES NFR BLD AUTO: 27.7 %
MCH RBC QN AUTO: 33.6 PG (ref 26–34)
MCHC RBC AUTO-ENTMCNC: 32.9 G/DL (ref 31–37)
MCV RBC AUTO: 102 FL
MONOCYTES # BLD AUTO: 0.49 X10(3) UL (ref 0.1–1)
MONOCYTES NFR BLD AUTO: 8.3 %
NEUTROPHILS # BLD AUTO: 3.58 X10 (3) UL (ref 1.5–7.7)
NEUTROPHILS # BLD AUTO: 3.58 X10(3) UL (ref 1.5–7.7)
NEUTROPHILS NFR BLD AUTO: 60.7 %
PLATELET # BLD AUTO: 295 10(3)UL (ref 150–450)
RBC # BLD AUTO: 3.96 X10(6)UL
WBC # BLD AUTO: 5.9 X10(3) UL (ref 4–11)

## 2022-06-13 PROCEDURE — 85025 COMPLETE CBC W/AUTO DIFF WBC: CPT

## 2022-06-13 PROCEDURE — 86140 C-REACTIVE PROTEIN: CPT

## 2022-06-13 PROCEDURE — 82565 ASSAY OF CREATININE: CPT

## 2022-06-13 PROCEDURE — 82040 ASSAY OF SERUM ALBUMIN: CPT

## 2022-06-13 PROCEDURE — 85652 RBC SED RATE AUTOMATED: CPT

## 2022-06-13 PROCEDURE — 84450 TRANSFERASE (AST) (SGOT): CPT

## 2022-06-13 PROCEDURE — 36415 COLL VENOUS BLD VENIPUNCTURE: CPT

## 2022-06-14 ENCOUNTER — OFFICE VISIT (OUTPATIENT)
Dept: RHEUMATOLOGY | Facility: CLINIC | Age: 62
End: 2022-06-14
Payer: COMMERCIAL

## 2022-06-14 VITALS
HEART RATE: 77 BPM | DIASTOLIC BLOOD PRESSURE: 77 MMHG | SYSTOLIC BLOOD PRESSURE: 122 MMHG | BODY MASS INDEX: 20.01 KG/M2 | WEIGHT: 117.19 LBS | HEIGHT: 64 IN

## 2022-06-14 DIAGNOSIS — M05.79 RHEUMATOID ARTHRITIS INVOLVING MULTIPLE SITES WITH POSITIVE RHEUMATOID FACTOR (HCC): Primary | ICD-10-CM

## 2022-06-14 DIAGNOSIS — Z51.81 THERAPEUTIC DRUG MONITORING: ICD-10-CM

## 2022-06-14 PROCEDURE — 3074F SYST BP LT 130 MM HG: CPT | Performed by: INTERNAL MEDICINE

## 2022-06-14 PROCEDURE — 3078F DIAST BP <80 MM HG: CPT | Performed by: INTERNAL MEDICINE

## 2022-06-14 PROCEDURE — 99214 OFFICE O/P EST MOD 30 MIN: CPT | Performed by: INTERNAL MEDICINE

## 2022-06-14 PROCEDURE — 3008F BODY MASS INDEX DOCD: CPT | Performed by: INTERNAL MEDICINE

## 2022-06-14 RX ORDER — PREDNISONE 1 MG/1
TABLET ORAL
Qty: 180 TABLET | Refills: 1 | COMMUNITY
Start: 2022-06-14

## 2022-07-22 RX ORDER — METHOTREXATE 2.5 MG/1
TABLET ORAL
Qty: 104 TABLET | Refills: 1 | Status: SHIPPED | OUTPATIENT
Start: 2022-07-22

## 2022-07-22 RX ORDER — METHOTREXATE 2.5 MG/1
TABLET ORAL
Qty: 104 TABLET | Refills: 1 | OUTPATIENT
Start: 2022-07-22

## 2022-08-02 RX ORDER — PREDNISONE 1 MG/1
TABLET ORAL
Qty: 180 TABLET | Refills: 1 | Status: SHIPPED | OUTPATIENT
Start: 2022-08-02

## 2022-09-18 NOTE — PROGRESS NOTES
Pallavi Castillo is a 58-year-old patient of Dr. Rosalina Townsend, with CCP and RF positive rheumatoid arthritis. Since I last saw her June 14th of 2022, she has continued 2 mg of prednisone every morning, and has continued methotrexate 20 mg weekly. Her arthritis is doing well. There is minimal stiffness and soreness across her hands in the morning. It takes several minutes to loosen. No joint swelling in the AM.    She doesn't have stomach upset or fatigue after taking methotrexate each week. She denies new medical or surgical problems since I saw her. No infections. Monitoring labs were repeated September 19th of 2022. CBC, creatinine, AST, albumin, sed rate of 25, and C-reactive protein of less than 0.29 are normal.    Review of Systems   Constitutional: Negative for fatigue and fever. Respiratory: Negative for shortness of breath. Cardiovascular: Negative for chest pain. Gastrointestinal: Negative for abdominal pain. Skin: Negative for rash. Hematological: Negative for adenopathy. Allergies:No Known Allergies    PHYSICAL EXAM:   Blood pressure 125/77, pulse 77, height 5' 4\" (1.626 m), weight 111 lb (50.3 kg), not currently breastfeeding. Constitutional: She is oriented to person, place, and time. She appears well-developed. HENT:   Head: Normocephalic. Eyes: Conjunctivae are normal.   Cardiovascular: Normal rate, regular rhythm and normal heart sounds. Pulmonary/Chest: Effort normal and breath sounds normal.   Abdominal: Soft. Bowel sounds are normal. There is no abdominal tenderness. Musculoskeletal:         General: No edema. Comments: No swelling across her second and third finger MCP and PIP joints.  strength is good bilaterally. Lymphadenopathy:     She has no cervical adenopathy. Neurological: She is alert and oriented to person, place, and time. Skin: No rash noted. Psychiatric: She has a normal mood and affect.         ASSESSMENT/PLAN:     1.  CCP and RF positive rheumatoid arthritis. Under good control. She will continue methotrexate 20 mg weekly, and 2 mg of prednisone every morning. She will return in 4 months, after repeat monitoring tests. Her prescriptions have been refilled. 2.  Seizure disorder, doing well on Keppra. 3.  Status post benign brain tumor removed in 2011.

## 2022-09-19 ENCOUNTER — LAB ENCOUNTER (OUTPATIENT)
Dept: LAB | Age: 62
End: 2022-09-19
Attending: INTERNAL MEDICINE

## 2022-09-19 DIAGNOSIS — M05.79 RHEUMATOID ARTHRITIS INVOLVING MULTIPLE SITES WITH POSITIVE RHEUMATOID FACTOR (HCC): ICD-10-CM

## 2022-09-19 DIAGNOSIS — Z51.81 THERAPEUTIC DRUG MONITORING: ICD-10-CM

## 2022-09-19 LAB
ALBUMIN SERPL-MCNC: 4.1 G/DL (ref 3.4–5)
AST SERPL-CCNC: 23 U/L (ref 15–37)
BASOPHILS # BLD AUTO: 0.06 X10(3) UL (ref 0–0.2)
BASOPHILS NFR BLD AUTO: 0.9 %
CREAT BLD-MCNC: 0.86 MG/DL
CRP SERPL-MCNC: <0.29 MG/DL (ref ?–0.3)
DEPRECATED RDW RBC AUTO: 48.6 FL (ref 35.1–46.3)
EOSINOPHIL # BLD AUTO: 0.09 X10(3) UL (ref 0–0.7)
EOSINOPHIL NFR BLD AUTO: 1.3 %
ERYTHROCYTE [DISTWIDTH] IN BLOOD BY AUTOMATED COUNT: 13.4 % (ref 11–15)
ERYTHROCYTE [SEDIMENTATION RATE] IN BLOOD: 25 MM/HR
GFR SERPLBLD BASED ON 1.73 SQ M-ARVRAT: 76 ML/MIN/1.73M2 (ref 60–?)
HCT VFR BLD AUTO: 44 %
HGB BLD-MCNC: 14.5 G/DL
IMM GRANULOCYTES # BLD AUTO: 0.02 X10(3) UL (ref 0–1)
IMM GRANULOCYTES NFR BLD: 0.3 %
LYMPHOCYTES # BLD AUTO: 1.45 X10(3) UL (ref 1–4)
LYMPHOCYTES NFR BLD AUTO: 21.1 %
MCH RBC QN AUTO: 33 PG (ref 26–34)
MCHC RBC AUTO-ENTMCNC: 33 G/DL (ref 31–37)
MCV RBC AUTO: 100.2 FL
MONOCYTES # BLD AUTO: 0.67 X10(3) UL (ref 0.1–1)
MONOCYTES NFR BLD AUTO: 9.8 %
NEUTROPHILS # BLD AUTO: 4.58 X10 (3) UL (ref 1.5–7.7)
NEUTROPHILS # BLD AUTO: 4.58 X10(3) UL (ref 1.5–7.7)
NEUTROPHILS NFR BLD AUTO: 66.6 %
PLATELET # BLD AUTO: 342 10(3)UL (ref 150–450)
RBC # BLD AUTO: 4.39 X10(6)UL
WBC # BLD AUTO: 6.9 X10(3) UL (ref 4–11)

## 2022-09-19 PROCEDURE — 84450 TRANSFERASE (AST) (SGOT): CPT

## 2022-09-19 PROCEDURE — 86140 C-REACTIVE PROTEIN: CPT

## 2022-09-19 PROCEDURE — 85652 RBC SED RATE AUTOMATED: CPT

## 2022-09-19 PROCEDURE — 82040 ASSAY OF SERUM ALBUMIN: CPT

## 2022-09-19 PROCEDURE — 82565 ASSAY OF CREATININE: CPT

## 2022-09-19 PROCEDURE — 36415 COLL VENOUS BLD VENIPUNCTURE: CPT

## 2022-09-19 PROCEDURE — 85025 COMPLETE CBC W/AUTO DIFF WBC: CPT

## 2022-09-20 ENCOUNTER — OFFICE VISIT (OUTPATIENT)
Dept: RHEUMATOLOGY | Facility: CLINIC | Age: 62
End: 2022-09-20

## 2022-09-20 VITALS
BODY MASS INDEX: 18.95 KG/M2 | DIASTOLIC BLOOD PRESSURE: 77 MMHG | HEART RATE: 77 BPM | WEIGHT: 111 LBS | SYSTOLIC BLOOD PRESSURE: 125 MMHG | HEIGHT: 64 IN

## 2022-09-20 DIAGNOSIS — Z51.81 THERAPEUTIC DRUG MONITORING: ICD-10-CM

## 2022-09-20 DIAGNOSIS — M05.79 RHEUMATOID ARTHRITIS INVOLVING MULTIPLE SITES WITH POSITIVE RHEUMATOID FACTOR (HCC): Primary | ICD-10-CM

## 2022-09-20 PROCEDURE — 3078F DIAST BP <80 MM HG: CPT | Performed by: INTERNAL MEDICINE

## 2022-09-20 PROCEDURE — 99214 OFFICE O/P EST MOD 30 MIN: CPT | Performed by: INTERNAL MEDICINE

## 2022-09-20 PROCEDURE — 3074F SYST BP LT 130 MM HG: CPT | Performed by: INTERNAL MEDICINE

## 2022-09-20 PROCEDURE — 3008F BODY MASS INDEX DOCD: CPT | Performed by: INTERNAL MEDICINE

## 2022-10-03 RX ORDER — FOLIC ACID 1 MG/1
TABLET ORAL
Qty: 90 TABLET | Refills: 3 | Status: SHIPPED | OUTPATIENT
Start: 2022-10-03

## 2022-10-03 NOTE — TELEPHONE ENCOUNTER
LOV: 9/20/22  Last Refilled:#90, 3rfs 10/18/21  Labs: Future Appointments   Date Time Provider Jenifer Regan   1/17/2023 11:00 AM Fam Thomas MD [facility] [place] [facility] [place]       Please advise.

## 2022-10-27 ENCOUNTER — HOSPITAL ENCOUNTER (OUTPATIENT)
Dept: MAMMOGRAPHY | Age: 62
Discharge: HOME | End: 2022-10-27
Attending: INTERNAL MEDICINE
Payer: COMMERCIAL

## 2022-10-27 DIAGNOSIS — Z12.31 ENCOUNTER FOR SCREENING MAMMOGRAM FOR MALIGNANT NEOPLASM OF BREAST: ICD-10-CM

## 2022-10-27 PROCEDURE — 77063 BREAST TOMOSYNTHESIS BI: CPT | Performed by: INTERNAL MEDICINE

## 2022-10-27 PROCEDURE — 77067 SCR MAMMO BI INCL CAD: CPT | Performed by: INTERNAL MEDICINE

## 2023-01-12 NOTE — PROGRESS NOTES
Princess Munoz is a 41-year-old patient of Dr. Ilan Colon, with CCP and RF positive rheumatoid arthritis. Since I last saw her September 20th of 2022, she has continued 2 mg of prednisone every morning, and methotrexate 20 mg weekly. Her hands and wrists are doing well. There is minimal stiffness and soreness across her hands in the morning. It takes several minutes to loosen. No joint swelling in the AM.    Thanksgiving of 2022 she twisted her right foot, and there was swelling over her foot. It was difficult to walk. Her knees also became painful, making it hard to walk over the holidays. They are alot better now. She takes vitamin D. Her last DEXA was February 2012 when her T-scores were -1.1 in her left femoral neck, -0.9 in her left total hip, and -2.2 in her lumbar spine. She doesn't have stomach upset or fatigue after taking methotrexate each week. She denies new medical or surgical problems since I saw her. No infections. Monitoring labs were repeated 1/14/2023. CBC, creatinine, AST, albumin, sed rate of 29, and C-reactive protein of less than 0.29 are normal.    Review of Systems   Constitutional: Negative for fatigue and fever. Respiratory: Negative for shortness of breath. Cardiovascular: Negative for chest pain. Gastrointestinal: Negative for abdominal pain. Skin: Negative for rash. Hematological: Negative for adenopathy. Allergies:No Known Allergies    PHYSICAL EXAM:   Blood pressure 130/80, pulse 88, height 5' 4\" (1.626 m), weight 116 lb (52.6 kg), not currently breastfeeding. Constitutional: She is oriented to person, place, and time. She appears well-developed. HENT:   Head: Normocephalic. Eyes: Conjunctivae are normal.   Cardiovascular: Normal rate, regular rhythm and normal heart sounds. Pulmonary/Chest: Effort normal and breath sounds normal.   Abdominal: Soft. Bowel sounds are normal. There is no abdominal tenderness.    Musculoskeletal:         General: No edema. Comments: No swelling across her second and third finger MCP and PIP joints.  strength is good bilaterally. Her left knee flexes and extends fully without pain. No effusion. Minimal swelling over the top of her right foot. She walks without a limp. Lymphadenopathy:     She has no cervical adenopathy. Neurological: She is alert and oriented to person, place, and time. Skin: No rash noted. Psychiatric: She has a normal mood and affect. ASSESSMENT/PLAN:     1.  CCP and RF positive rheumatoid arthritis. Under good control. She will continue methotrexate 20 mg weekly, and 2 mg of prednisone every morning. She will return in 4 months, after repeat monitoring tests. Her prescriptions were refilled. 2.  Seizure disorder, doing well on Keppra. 3.  Status post benign brain tumor removed in 2011.    4.  Sudden onset right foot pain after she twisted Thanksgiving of 2022. Much better. Her DEXA scan in February of 2012 showed severe osteopenia. Her DEXA scan will be repeated.   She will continue weightbearing exercise, calcium, and vitamin D.

## 2023-01-14 ENCOUNTER — LAB ENCOUNTER (OUTPATIENT)
Dept: LAB | Age: 63
End: 2023-01-14
Attending: INTERNAL MEDICINE
Payer: COMMERCIAL

## 2023-01-14 DIAGNOSIS — M05.79 RHEUMATOID ARTHRITIS INVOLVING MULTIPLE SITES WITH POSITIVE RHEUMATOID FACTOR (HCC): ICD-10-CM

## 2023-01-14 DIAGNOSIS — Z51.81 THERAPEUTIC DRUG MONITORING: ICD-10-CM

## 2023-01-14 LAB
ALBUMIN SERPL-MCNC: 3.9 G/DL (ref 3.4–5)
AST SERPL-CCNC: 31 U/L (ref 15–37)
BASOPHILS # BLD AUTO: 0.06 X10(3) UL (ref 0–0.2)
BASOPHILS NFR BLD AUTO: 0.8 %
CREAT BLD-MCNC: 0.84 MG/DL
CRP SERPL-MCNC: <0.29 MG/DL (ref ?–0.3)
DEPRECATED RDW RBC AUTO: 46.9 FL (ref 35.1–46.3)
EOSINOPHIL # BLD AUTO: 0.15 X10(3) UL (ref 0–0.7)
EOSINOPHIL NFR BLD AUTO: 2 %
ERYTHROCYTE [DISTWIDTH] IN BLOOD BY AUTOMATED COUNT: 13.3 % (ref 11–15)
GFR SERPLBLD BASED ON 1.73 SQ M-ARVRAT: 78 ML/MIN/1.73M2 (ref 60–?)
HCT VFR BLD AUTO: 40.6 %
HGB BLD-MCNC: 13.7 G/DL
IMM GRANULOCYTES # BLD AUTO: 0.02 X10(3) UL (ref 0–1)
IMM GRANULOCYTES NFR BLD: 0.3 %
LYMPHOCYTES # BLD AUTO: 1.98 X10(3) UL (ref 1–4)
LYMPHOCYTES NFR BLD AUTO: 27 %
MCH RBC QN AUTO: 33 PG (ref 26–34)
MCHC RBC AUTO-ENTMCNC: 33.7 G/DL (ref 31–37)
MCV RBC AUTO: 97.8 FL
MONOCYTES # BLD AUTO: 0.67 X10(3) UL (ref 0.1–1)
MONOCYTES NFR BLD AUTO: 9.2 %
NEUTROPHILS # BLD AUTO: 4.44 X10 (3) UL (ref 1.5–7.7)
NEUTROPHILS # BLD AUTO: 4.44 X10(3) UL (ref 1.5–7.7)
NEUTROPHILS NFR BLD AUTO: 60.7 %
PLATELET # BLD AUTO: 368 10(3)UL (ref 150–450)
RBC # BLD AUTO: 4.15 X10(6)UL
WBC # BLD AUTO: 7.3 X10(3) UL (ref 4–11)

## 2023-01-14 PROCEDURE — 86140 C-REACTIVE PROTEIN: CPT

## 2023-01-14 PROCEDURE — 82040 ASSAY OF SERUM ALBUMIN: CPT

## 2023-01-14 PROCEDURE — 84450 TRANSFERASE (AST) (SGOT): CPT

## 2023-01-14 PROCEDURE — 36415 COLL VENOUS BLD VENIPUNCTURE: CPT

## 2023-01-14 PROCEDURE — 85025 COMPLETE CBC W/AUTO DIFF WBC: CPT

## 2023-01-14 PROCEDURE — 82565 ASSAY OF CREATININE: CPT

## 2023-01-14 PROCEDURE — 85652 RBC SED RATE AUTOMATED: CPT

## 2023-01-15 LAB — ERYTHROCYTE [SEDIMENTATION RATE] IN BLOOD: 29 MM/HR

## 2023-01-17 ENCOUNTER — OFFICE VISIT (OUTPATIENT)
Dept: RHEUMATOLOGY | Facility: CLINIC | Age: 63
End: 2023-01-17

## 2023-01-17 VITALS
BODY MASS INDEX: 19.81 KG/M2 | WEIGHT: 116 LBS | HEIGHT: 64 IN | HEART RATE: 88 BPM | DIASTOLIC BLOOD PRESSURE: 80 MMHG | SYSTOLIC BLOOD PRESSURE: 130 MMHG

## 2023-01-17 DIAGNOSIS — M05.79 RHEUMATOID ARTHRITIS INVOLVING MULTIPLE SITES WITH POSITIVE RHEUMATOID FACTOR (HCC): Primary | ICD-10-CM

## 2023-01-17 DIAGNOSIS — Z51.81 THERAPEUTIC DRUG MONITORING: ICD-10-CM

## 2023-01-17 DIAGNOSIS — M81.0 AGE-RELATED OSTEOPOROSIS WITHOUT CURRENT PATHOLOGICAL FRACTURE: ICD-10-CM

## 2023-01-17 PROCEDURE — 99214 OFFICE O/P EST MOD 30 MIN: CPT | Performed by: INTERNAL MEDICINE

## 2023-01-17 PROCEDURE — 3008F BODY MASS INDEX DOCD: CPT | Performed by: INTERNAL MEDICINE

## 2023-01-17 PROCEDURE — 3075F SYST BP GE 130 - 139MM HG: CPT | Performed by: INTERNAL MEDICINE

## 2023-01-17 PROCEDURE — 3079F DIAST BP 80-89 MM HG: CPT | Performed by: INTERNAL MEDICINE

## 2023-01-17 RX ORDER — METHOTREXATE 2.5 MG/1
TABLET ORAL
Qty: 104 TABLET | Refills: 1 | Status: SHIPPED | OUTPATIENT
Start: 2023-01-17

## 2023-01-17 RX ORDER — PREDNISONE 1 MG/1
TABLET ORAL
Qty: 180 TABLET | Refills: 1 | Status: SHIPPED | OUTPATIENT
Start: 2023-01-17

## 2023-04-10 ENCOUNTER — HOSPITAL ENCOUNTER (OUTPATIENT)
Dept: BONE DENSITY | Age: 63
Discharge: HOME | End: 2023-04-10
Attending: INTERNAL MEDICINE
Payer: COMMERCIAL

## 2023-04-10 DIAGNOSIS — M81.0 AGE-RELATED OSTEOPOROSIS WITHOUT CURRENT PATHOLOGICAL FRACTURE: ICD-10-CM

## 2023-04-10 PROCEDURE — 77080 DXA BONE DENSITY AXIAL: CPT | Performed by: INTERNAL MEDICINE

## 2023-04-17 ENCOUNTER — TELEPHONE (OUTPATIENT)
Dept: RHEUMATOLOGY | Facility: CLINIC | Age: 63
End: 2023-04-17

## 2023-04-17 RX ORDER — ALENDRONATE SODIUM 70 MG/1
TABLET ORAL
Qty: 12 TABLET | Refills: 3 | Status: SHIPPED | OUTPATIENT
Start: 2023-04-17

## 2023-04-17 NOTE — TELEPHONE ENCOUNTER
I spoke to [facility]. Her bone density scan does not look good. Her T-scores were -3.0 in her left femoral neck, -2.3 in her left total hip, and -3.7 in her lumbar spine. Compared to previous DEXA in February 2012, this is a 30% drop in her hip, and 20% drop in her lumbar spine, putting her at significant fracture risk. She will start alendronate 70 mg weekly on an empty stomach. She is down to prednisone 1 mg a day and doing fine. She will stop prednisone after another week. Her follow-up is scheduled May 17th.

## 2023-05-11 NOTE — PROGRESS NOTES
Roberto Harrison is a 79-year-old patient of Dr. Anita Canas, with CCP and RF positive rheumatoid arthritis. Since I last saw her 1/17/2023, she has dropped to 1 mg of prednisone every morning, and has continued methotrexate 20 mg weekly. Her hands and wrists are doing well. There is minimal stiffness and soreness across her hands in the morning. It takes several minutes to loosen. No joint swelling in the AM.    Thanksgiving of 2022 she twisted her right foot, and there was swelling over her lateral foot. It was difficult to walk. It has gotten much better. Her knees also became painful, making it hard to walk over the holidays. Continued to be painful when she walks. Mild swelling. She takes vitamin D. Her DEXA scan was repeated 4/10/2023. Her T-scores were -3.0 in her left femoral neck, -2.3 in her left total hip, and -3.7 in her lumbar spine. This is 30% worse in her hip compared to 2012, and 21% worse in her lumbar spine. She is taking alendronate 70 mg weekly on an empty stomach without ill effects. She takes calcium and vitamin D. She does not get a lot of weightbearing exercise. She doesn't have stomach upset or fatigue after taking methotrexate each week. She denies new medical or surgical problems since I saw her. No infections. Monitoring labs were repeated 5/15/2023. CBC, creatinine, AST, albumin, and C-reactive protein of less than 0.29 are normal.  Sed rate was newly high at 44. Review of Systems   Constitutional: Negative for fatigue and fever. Respiratory: Negative for shortness of breath. Cardiovascular: Negative for chest pain. Gastrointestinal: Negative for abdominal pain. Skin: Negative for rash. Hematological: Negative for adenopathy. Allergies:No Known Allergies    PHYSICAL EXAM:   Blood pressure 131/77, pulse 80, height 5' 4\" (1.626 m), weight 118 lb (53.5 kg), not currently breastfeeding. Constitutional: She is oriented to person, place, and time. She appears well-developed. HENT:   Head: Normocephalic. Eyes: Conjunctivae are normal.   Cardiovascular: Normal rate, regular rhythm and normal heart sounds. Pulmonary/Chest: Effort normal and breath sounds normal.   Abdominal: Soft. Bowel sounds are normal. There is no abdominal tenderness. Musculoskeletal:         General: No edema. Comments: Minimal swelling in proximal right third finger.  strength is good bilaterally. Small effusions both knees. Some crepitance and discomfort with flexion and extension of both. She walks without a limp. Lymphadenopathy:     She has no cervical adenopathy. Neurological: She is alert and oriented to person, place, and time. Skin: No rash noted. Psychiatric: She has a normal mood and affect. ASSESSMENT/PLAN:     1.  CCP and RF positive rheumatoid arthritis. Question mild activity. We will take a Medrol Dosepak and see how she does. She will continue methotrexate 20 mg weekly, and 1 mg of prednisone every morning. May consider adding medication such as leflunomide to methotrexate. Follow-up will be set up, determine by her course. Plain x-rays of her knees will be done. May need physical therapy. I will call her with her results. 2.  Seizure disorder, doing well on Keppra. 3.  Status post benign brain tumor removed in 2011.    4.  Sudden onset right foot pain after she twisted Thanksgiving of 2022. Much better, need to rule out fracture. X-rays will be done. Forrest Gloria Her DEXA scan is much worse than in 2012, and she has significant osteoporosis. On alendronate 70 mg weekly, calcium, and vitamin D. She will try to increase her weightbearing exercise.

## 2023-05-15 ENCOUNTER — LAB ENCOUNTER (OUTPATIENT)
Dept: LAB | Age: 63
End: 2023-05-15
Attending: INTERNAL MEDICINE
Payer: COMMERCIAL

## 2023-05-15 DIAGNOSIS — M05.79 RHEUMATOID ARTHRITIS INVOLVING MULTIPLE SITES WITH POSITIVE RHEUMATOID FACTOR (HCC): ICD-10-CM

## 2023-05-15 DIAGNOSIS — Z51.81 THERAPEUTIC DRUG MONITORING: ICD-10-CM

## 2023-05-15 LAB
ALBUMIN SERPL-MCNC: 3.8 G/DL (ref 3.4–5)
AST SERPL-CCNC: 32 U/L (ref 15–37)
BASOPHILS # BLD AUTO: 0.05 X10(3) UL (ref 0–0.2)
BASOPHILS NFR BLD AUTO: 0.8 %
CREAT BLD-MCNC: 0.76 MG/DL
CRP SERPL-MCNC: <0.29 MG/DL (ref ?–0.3)
DEPRECATED RDW RBC AUTO: 45.6 FL (ref 35.1–46.3)
EOSINOPHIL # BLD AUTO: 0.11 X10(3) UL (ref 0–0.7)
EOSINOPHIL NFR BLD AUTO: 1.7 %
ERYTHROCYTE [DISTWIDTH] IN BLOOD BY AUTOMATED COUNT: 13.2 % (ref 11–15)
ERYTHROCYTE [SEDIMENTATION RATE] IN BLOOD: 44 MM/HR
GFR SERPLBLD BASED ON 1.73 SQ M-ARVRAT: 88 ML/MIN/1.73M2 (ref 60–?)
HCT VFR BLD AUTO: 39.9 %
HGB BLD-MCNC: 13.4 G/DL
IMM GRANULOCYTES # BLD AUTO: 0.02 X10(3) UL (ref 0–1)
IMM GRANULOCYTES NFR BLD: 0.3 %
LYMPHOCYTES # BLD AUTO: 1.34 X10(3) UL (ref 1–4)
LYMPHOCYTES NFR BLD AUTO: 21.1 %
MCH RBC QN AUTO: 32.8 PG (ref 26–34)
MCHC RBC AUTO-ENTMCNC: 33.6 G/DL (ref 31–37)
MCV RBC AUTO: 97.8 FL
MONOCYTES # BLD AUTO: 0.62 X10(3) UL (ref 0.1–1)
MONOCYTES NFR BLD AUTO: 9.7 %
NEUTROPHILS # BLD AUTO: 4.22 X10 (3) UL (ref 1.5–7.7)
NEUTROPHILS # BLD AUTO: 4.22 X10(3) UL (ref 1.5–7.7)
NEUTROPHILS NFR BLD AUTO: 66.4 %
PLATELET # BLD AUTO: 312 10(3)UL (ref 150–450)
RBC # BLD AUTO: 4.08 X10(6)UL
WBC # BLD AUTO: 6.4 X10(3) UL (ref 4–11)

## 2023-05-15 PROCEDURE — 84450 TRANSFERASE (AST) (SGOT): CPT

## 2023-05-15 PROCEDURE — 36415 COLL VENOUS BLD VENIPUNCTURE: CPT

## 2023-05-15 PROCEDURE — 85025 COMPLETE CBC W/AUTO DIFF WBC: CPT

## 2023-05-15 PROCEDURE — 82040 ASSAY OF SERUM ALBUMIN: CPT

## 2023-05-15 PROCEDURE — 86140 C-REACTIVE PROTEIN: CPT

## 2023-05-15 PROCEDURE — 82565 ASSAY OF CREATININE: CPT

## 2023-05-15 PROCEDURE — 85652 RBC SED RATE AUTOMATED: CPT

## 2023-05-17 ENCOUNTER — OFFICE VISIT (OUTPATIENT)
Dept: RHEUMATOLOGY | Facility: CLINIC | Age: 63
End: 2023-05-17

## 2023-05-17 ENCOUNTER — HOSPITAL ENCOUNTER (OUTPATIENT)
Dept: GENERAL RADIOLOGY | Age: 63
Discharge: HOME | End: 2023-05-17
Attending: INTERNAL MEDICINE
Payer: COMMERCIAL

## 2023-05-17 VITALS
BODY MASS INDEX: 20.14 KG/M2 | HEART RATE: 80 BPM | DIASTOLIC BLOOD PRESSURE: 77 MMHG | SYSTOLIC BLOOD PRESSURE: 131 MMHG | HEIGHT: 64 IN | WEIGHT: 118 LBS

## 2023-05-17 DIAGNOSIS — M25.561 CHRONIC PAIN OF BOTH KNEES: ICD-10-CM

## 2023-05-17 DIAGNOSIS — M25.562 CHRONIC PAIN OF BOTH KNEES: ICD-10-CM

## 2023-05-17 DIAGNOSIS — M81.0 AGE-RELATED OSTEOPOROSIS WITHOUT CURRENT PATHOLOGICAL FRACTURE: ICD-10-CM

## 2023-05-17 DIAGNOSIS — M05.79 RHEUMATOID ARTHRITIS INVOLVING MULTIPLE SITES WITH POSITIVE RHEUMATOID FACTOR (HCC): Primary | ICD-10-CM

## 2023-05-17 DIAGNOSIS — Z51.81 THERAPEUTIC DRUG MONITORING: ICD-10-CM

## 2023-05-17 DIAGNOSIS — M79.671 FOOT PAIN, RIGHT: ICD-10-CM

## 2023-05-17 DIAGNOSIS — G89.29 CHRONIC PAIN OF BOTH KNEES: ICD-10-CM

## 2023-05-17 PROCEDURE — 73565 X-RAY EXAM OF KNEES: CPT | Performed by: INTERNAL MEDICINE

## 2023-05-17 PROCEDURE — 3008F BODY MASS INDEX DOCD: CPT | Performed by: INTERNAL MEDICINE

## 2023-05-17 PROCEDURE — 3075F SYST BP GE 130 - 139MM HG: CPT | Performed by: INTERNAL MEDICINE

## 2023-05-17 PROCEDURE — 73620 X-RAY EXAM OF FOOT: CPT | Performed by: INTERNAL MEDICINE

## 2023-05-17 PROCEDURE — 3078F DIAST BP <80 MM HG: CPT | Performed by: INTERNAL MEDICINE

## 2023-05-17 PROCEDURE — 99214 OFFICE O/P EST MOD 30 MIN: CPT | Performed by: INTERNAL MEDICINE

## 2023-05-17 RX ORDER — METHYLPREDNISOLONE 4 MG/1
TABLET ORAL
Qty: 1 EACH | Refills: 0 | Status: SHIPPED | OUTPATIENT
Start: 2023-05-17

## 2023-05-17 RX ORDER — PREDNISONE 1 MG/1
TABLET ORAL
Qty: 180 TABLET | Refills: 1 | COMMUNITY
Start: 2023-05-17

## 2023-05-22 ENCOUNTER — TELEPHONE (OUTPATIENT)
Dept: RHEUMATOLOGY | Facility: CLINIC | Age: 63
End: 2023-05-22

## 2023-05-22 DIAGNOSIS — Z51.81 THERAPEUTIC DRUG MONITORING: ICD-10-CM

## 2023-05-22 DIAGNOSIS — M05.79 RHEUMATOID ARTHRITIS INVOLVING MULTIPLE SITES WITH POSITIVE RHEUMATOID FACTOR (HCC): Primary | ICD-10-CM

## 2023-05-22 RX ORDER — LEFLUNOMIDE 10 MG/1
TABLET ORAL
Qty: 30 TABLET | Refills: 2 | Status: SHIPPED | OUTPATIENT
Start: 2023-05-22

## 2023-05-22 NOTE — TELEPHONE ENCOUNTER
I called Steve Araujo with her right foot x-ray results. 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 She has erosive change at the head of her fifth metatarsal.  She has minimal deformity of the head neck of the fourth metatarsal.  She recalls breaking this in the past.    Her arthritis is not controlled. Leflunomide 10 mg a day will be added. She will schedule follow-up in late June or early July, after repeat monitoring tests.

## 2023-06-26 ENCOUNTER — LAB ENCOUNTER (OUTPATIENT)
Dept: LAB | Age: 63
End: 2023-06-26
Attending: INTERNAL MEDICINE
Payer: COMMERCIAL

## 2023-06-26 DIAGNOSIS — M05.79 RHEUMATOID ARTHRITIS INVOLVING MULTIPLE SITES WITH POSITIVE RHEUMATOID FACTOR (HCC): ICD-10-CM

## 2023-06-26 DIAGNOSIS — Z51.81 THERAPEUTIC DRUG MONITORING: ICD-10-CM

## 2023-06-26 LAB
ALBUMIN SERPL-MCNC: 4 G/DL (ref 3.4–5)
ALT SERPL-CCNC: 70 U/L
AST SERPL-CCNC: 32 U/L (ref 15–37)
BASOPHILS # BLD AUTO: 0.05 X10(3) UL (ref 0–0.2)
BASOPHILS NFR BLD AUTO: 1 %
CREAT BLD-MCNC: 0.75 MG/DL
CRP SERPL-MCNC: <0.29 MG/DL (ref ?–0.3)
DEPRECATED RDW RBC AUTO: 47.6 FL (ref 35.1–46.3)
EOSINOPHIL # BLD AUTO: 0.07 X10(3) UL (ref 0–0.7)
EOSINOPHIL NFR BLD AUTO: 1.4 %
ERYTHROCYTE [DISTWIDTH] IN BLOOD BY AUTOMATED COUNT: 13.6 % (ref 11–15)
ERYTHROCYTE [SEDIMENTATION RATE] IN BLOOD: 18 MM/HR
GFR SERPLBLD BASED ON 1.73 SQ M-ARVRAT: 89 ML/MIN/1.73M2 (ref 60–?)
HCT VFR BLD AUTO: 42.3 %
HGB BLD-MCNC: 14.1 G/DL
IMM GRANULOCYTES # BLD AUTO: 0.01 X10(3) UL (ref 0–1)
IMM GRANULOCYTES NFR BLD: 0.2 %
LYMPHOCYTES # BLD AUTO: 1.09 X10(3) UL (ref 1–4)
LYMPHOCYTES NFR BLD AUTO: 22.3 %
MCH RBC QN AUTO: 32.5 PG (ref 26–34)
MCHC RBC AUTO-ENTMCNC: 33.3 G/DL (ref 31–37)
MCV RBC AUTO: 97.5 FL
MONOCYTES # BLD AUTO: 0.54 X10(3) UL (ref 0.1–1)
MONOCYTES NFR BLD AUTO: 11 %
NEUTROPHILS # BLD AUTO: 3.13 X10 (3) UL (ref 1.5–7.7)
NEUTROPHILS # BLD AUTO: 3.13 X10(3) UL (ref 1.5–7.7)
NEUTROPHILS NFR BLD AUTO: 64.1 %
PLATELET # BLD AUTO: 293 10(3)UL (ref 150–450)
RBC # BLD AUTO: 4.34 X10(6)UL
WBC # BLD AUTO: 4.9 X10(3) UL (ref 4–11)

## 2023-06-26 PROCEDURE — 84450 TRANSFERASE (AST) (SGOT): CPT

## 2023-06-26 PROCEDURE — 85652 RBC SED RATE AUTOMATED: CPT

## 2023-06-26 PROCEDURE — 82040 ASSAY OF SERUM ALBUMIN: CPT

## 2023-06-26 PROCEDURE — 82565 ASSAY OF CREATININE: CPT

## 2023-06-26 PROCEDURE — 84460 ALANINE AMINO (ALT) (SGPT): CPT

## 2023-06-26 PROCEDURE — 36415 COLL VENOUS BLD VENIPUNCTURE: CPT

## 2023-06-26 PROCEDURE — 85025 COMPLETE CBC W/AUTO DIFF WBC: CPT

## 2023-06-26 PROCEDURE — 86140 C-REACTIVE PROTEIN: CPT

## 2023-06-28 ENCOUNTER — OFFICE VISIT (OUTPATIENT)
Dept: RHEUMATOLOGY | Facility: CLINIC | Age: 63
End: 2023-06-28

## 2023-06-28 VITALS
HEIGHT: 64 IN | HEART RATE: 90 BPM | WEIGHT: 116 LBS | BODY MASS INDEX: 19.81 KG/M2 | SYSTOLIC BLOOD PRESSURE: 129 MMHG | DIASTOLIC BLOOD PRESSURE: 83 MMHG

## 2023-06-28 DIAGNOSIS — Z51.81 THERAPEUTIC DRUG MONITORING: ICD-10-CM

## 2023-06-28 DIAGNOSIS — M05.79 RHEUMATOID ARTHRITIS INVOLVING MULTIPLE SITES WITH POSITIVE RHEUMATOID FACTOR (HCC): Primary | ICD-10-CM

## 2023-06-28 PROCEDURE — 99214 OFFICE O/P EST MOD 30 MIN: CPT | Performed by: INTERNAL MEDICINE

## 2023-06-28 PROCEDURE — 3008F BODY MASS INDEX DOCD: CPT | Performed by: INTERNAL MEDICINE

## 2023-06-28 PROCEDURE — 3074F SYST BP LT 130 MM HG: CPT | Performed by: INTERNAL MEDICINE

## 2023-06-28 PROCEDURE — 3079F DIAST BP 80-89 MM HG: CPT | Performed by: INTERNAL MEDICINE

## 2023-06-28 RX ORDER — LEFLUNOMIDE 10 MG/1
TABLET ORAL
Qty: 90 TABLET | Refills: 1 | Status: SHIPPED | OUTPATIENT
Start: 2023-06-28

## 2023-06-28 RX ORDER — METHOTREXATE 2.5 MG/1
TABLET ORAL
Qty: 104 TABLET | Refills: 1 | Status: SHIPPED | OUTPATIENT
Start: 2023-06-28

## 2023-06-28 RX ORDER — FOLIC ACID 1 MG/1
1 TABLET ORAL DAILY
Qty: 90 TABLET | Refills: 3 | Status: SHIPPED | OUTPATIENT
Start: 2023-06-28

## 2023-07-12 ENCOUNTER — MED REC SCAN ONLY (OUTPATIENT)
Dept: RHEUMATOLOGY | Facility: CLINIC | Age: 63
End: 2023-07-12

## 2023-08-08 ENCOUNTER — TELEPHONE (OUTPATIENT)
Dept: RHEUMATOLOGY | Facility: CLINIC | Age: 63
End: 2023-08-08

## 2023-08-08 RX ORDER — LEFLUNOMIDE 10 MG/1
TABLET ORAL
Qty: 90 TABLET | Refills: 1 | Status: SHIPPED | OUTPATIENT
Start: 2023-08-08

## 2023-08-08 NOTE — TELEPHONE ENCOUNTER
Courtney from [facility] in [place] is calling to request patient's medical clearance . Patient has an appointment on 8/9/2023 for the impression but the date for the actual extraction has not been scheduled. Required information will be faxed to Dr. Erin Ireland office for completion. Requesting information as soon as possible, if able to by end of day 8/8/23.     Phone [phone]

## 2023-08-08 NOTE — TELEPHONE ENCOUNTER
Talked to pt. - she went off alendroante on 7/24 /2023 - b/c she was having stomach cramps - and ti's gotten much better.      Ok to send in paperwork

## 2023-08-08 NOTE — TELEPHONE ENCOUNTER
Please see message below. She was a pt of Dr. Felicita Adamson.       Future Appointments   Date Time Provider Jenifer Regan   10/19/2023 11:20 AM Beverly Rehman MD [facility] [place]

## 2023-08-09 NOTE — TELEPHONE ENCOUNTER
Godwin Frazier is calling and checking the status on this. I informed him if was faxed today and a confirmation was received. Per Godwin Frazier is has not been received and she would like it to be faxed again and if it can possibly be sent by email. Can you also try to call and let her know when this is done to verify if it was received. Per Godwin Frazier she states their number is to a call center and you can ask to speak with her directly. Fax# [address]: [email or web address]

## 2023-08-09 NOTE — TELEPHONE ENCOUNTER
[facility]  Ph: [phone]  Fax: [phone]    She is still waiting for pre-op dental clearance to be faxed to them. Pt's procedure is today 8-9-23 at 5pm.  The procedure today is to scan for a denture.   They need to know the pt does not need to be on a \"prolonged drug holiday\"

## 2023-10-19 ENCOUNTER — OFFICE VISIT (OUTPATIENT)
Dept: RHEUMATOLOGY | Facility: CLINIC | Age: 63
End: 2023-10-19

## 2023-10-19 VITALS
DIASTOLIC BLOOD PRESSURE: 94 MMHG | HEART RATE: 103 BPM | SYSTOLIC BLOOD PRESSURE: 167 MMHG | BODY MASS INDEX: 17.44 KG/M2 | HEIGHT: 64 IN | RESPIRATION RATE: 16 BRPM | WEIGHT: 102.13 LBS

## 2023-10-19 DIAGNOSIS — Z51.81 THERAPEUTIC DRUG MONITORING: ICD-10-CM

## 2023-10-19 DIAGNOSIS — M05.79 RHEUMATOID ARTHRITIS INVOLVING MULTIPLE SITES WITH POSITIVE RHEUMATOID FACTOR (HCC): Primary | ICD-10-CM

## 2023-10-19 DIAGNOSIS — M81.0 AGE-RELATED OSTEOPOROSIS WITHOUT CURRENT PATHOLOGICAL FRACTURE: ICD-10-CM

## 2023-10-19 PROCEDURE — 3008F BODY MASS INDEX DOCD: CPT | Performed by: INTERNAL MEDICINE

## 2023-10-19 PROCEDURE — 99215 OFFICE O/P EST HI 40 MIN: CPT | Performed by: INTERNAL MEDICINE

## 2023-10-19 PROCEDURE — 3080F DIAST BP >= 90 MM HG: CPT | Performed by: INTERNAL MEDICINE

## 2023-10-19 PROCEDURE — 3077F SYST BP >= 140 MM HG: CPT | Performed by: INTERNAL MEDICINE

## 2023-10-19 RX ORDER — LISINOPRIL 10 MG/1
10 TABLET ORAL DAILY
COMMUNITY
Start: 2023-10-17

## 2023-10-19 NOTE — PROGRESS NOTES
Geoffrey Miller is a 58year old female. HPI:   Patient presents with:  Rheumatoid Arthritis  Medication Follow-Up: Discuss prednisone   Lab: Need new lab orders        I had the pleasure of seeing Geoffrey Miller on 10/19/2023 for follow up. She  is a 79-year-old patient of Dr. Jh Perea, with CCP and RF positive rheumatoid arthritis. She is re-establshing since Dr. Noah Meza retired. She was last seen on 6/28/2023. She remained on 1 mg of prednisone every morning, and has continued methotrexate 20 mg weekly. Right foot x-rays show mild osteoarthritis first MTP joint, erosion head of fifth metatarsal, old fourth metatarsal fracture with minimal deformity. Knee x-rays nonspecific sclerosis medial left tibial plateau. No significant arthritic change. She was started on leflunomide 10 mg a day in 5/2023. She is tolerating it well. Her hands and wrists are doing well. There is minimal stiffness and soreness across her hands in the morning. It takes several minutes to loosen. No joint swelling in the AM.    She takes vitamin D. Her DEXA scan was repeated 4/10/2023. Her T-scores were -3.0 in her left femoral neck, -2.3 in her left total hip, and -3.7 in her lumbar spine. This is 30% worse in her hip compared to 2012, and 21% worse in her lumbar spine. She is taking alendronate 70 mg weekly on an empty stomach without ill effects. She takes calcium and vitamin D. She does not get a lot of weightbearing exercise. She doesn't have stomach upset or fatigue after taking methotrexate each week. She denies new medical or surgical problems since I saw her. No infections. 10/19/2023  She was off alendronate since 7/2023 - for dental procedure. She got it in 9/12/2023  She's getting her implants in December. She went off alendroante and was noted that she had diarrhea - she is not keen on starting on it again. She had just started in 5/2023 - .    She will see the peridenotis in December -. She's on methotrexate 20mg a week and prednisone 1mg ad ay. She got labs on 10/12 /2023 with dr. Martínez Wheeler last week. She also just started lisiniopril for HTN     She twised her right ankle in November 2022. She has had problmes since. She had right foot xray in 5/2023 -   She had a right ankle xray   She has trouble with balance -   The right foot is not numb - but it feels when she walks on it all day - it starts to get numb and she has trouble walking. HISTORY:  Past Medical History:   Diagnosis Date    Brain tumor ([facility] [address]     per NG:  craniotomy    Cyclic vomiting syndrome     Osteopenia 02/2012    per NG: osteopenia of spine per DEXA    Other and unspecified hyperlipidemia     Seizure disorder ([facility] [address]     per NG:anti seizure medication; brain tumor, craniotomy      Social Hx Reviewed   Family Hx Reviewed     Medications (Active prior to today's visit):  Current Outpatient Medications   Medication Sig Dispense Refill    lisinopril 10 MG Oral Tab Take 1 tablet (10 mg total) by mouth daily. leflunomide 10 MG Oral Tab Take one daily. 90 tablet 1    methotrexate 2.5 MG Oral Tab Take 8 tablets every Tuesday with dinner. 696 tablet 1    folic acid 1 MG Oral Tab Take 1 tablet (1 mg total) by mouth daily. 90 tablet 3    Calcium-Magnesium-Vitamin D (CALCIUM 500 OR) Take 1 tablet by mouth daily. predniSONE 1 MG Oral Tab TAKE 1 TABLETS BY MOUTH EVERY MORNING 180 tablet 1    levetiracetam (KEPPRA) 750 MG Oral Tab Take 1 tablet (750 mg total) by mouth 2 (two) times daily. 180 tablet 3    Ergocalciferol (VITAMIN D OR) Take 400 mg by mouth daily. methylPREDNISolone 4 MG Oral Tablet Therapy Pack Take as directed on package.  (Patient not taking: Reported on 6/28/2023) 1 each 0    alendronate 70 MG Oral Tab Take one weekly on an empty stomach, first thing in the AM. (Patient not taking: Reported on 10/19/2023) 12 tablet 3    Multiple Vitamins-Minerals (MULTI-VITAMIN/MINERALS) Oral Tab Take 1 tablet by mouth daily. (Patient not taking: Reported on 10/19/2023)       . cmed  Allergies:  No Known Allergies      ROS:   All other ROS are negative. PHYSICAL EXAM:   HEENT: Clear oropharynx, no oral ulcers, EOM intact, clear sclear, PERRLA, pleasant, no acute distress, no CAD, no neck tendnerness, good ROM,   No rashes  CVS: RRR, no murmurs  RS: CTAB, no crackles, no rhonchi  ABD: Soft Non tender, no HSM felt, BS positive  Joint exam:   No synovitis wrists or hands.  strength is good bilaterally. Small effusions both knees. The balls of her feet are not tender to squeeze. Lab Results   Component Value Date    WBC 4.9 06/26/2023    HGB 14.1 06/26/2023    HCT 42.3 06/26/2023    .0 06/26/2023    CREATSERUM 0.75 06/26/2023    ALB 4.0 06/26/2023    AST 32 06/26/2023    ALT 70 (H) 06/26/2023    ESRML 18 06/26/2023    CRP <0.29 06/26/2023 4/17/2023 - DEXA  LEFT FEMORAL NECK   BMD: 0.513 gm/sq. cm. T SCORE: -3.0 Z SCORE: -1.7      LEFT TOTAL HIP   BMD: 0.656 gm/sq. cm. T SCORE: -2.3 Z SCORE: -1.2      PA LUMBAR SPINE (L1 - L4)   BMD: 0.640 gm/sq. cm. T SCORE: -3.7 Z SCORE: -2.0           She had albs on 10/12/2023   Cmp - cr is 0.90, ast is 22, alt is 20,   Wbc is 5.8, hb is 14.3, plt are 317    5/17/2023 - right foot xray   Mild osteoarthritis arthritis first metatarsophalangeal joint. 2.                   Erosive change head of 5th metatarsal.   3.                             Minimal deformity of the head/neck 4th metatarsal perhaps from old trauma. 4.                             Demineralization. 5/17/2023 - b/lk nee xray   1. Nonspecific sclerosis in the medial aspect of the left tibial plateau and in the underlying epiphysis, which may represent changes of subchondral insufficiency injury. No cortical deformity or collapse of the tibial plateau. 2.  No acute osseous abnormality of the right knee. ASSESSMENT/PLAN:     1.  CCP and RF positive rheumatoid arthritis.   Erosion right fifth MTP joint. Controlled on methotrexate 20 mg weekly, 1 mg of prednisone and 10 mg of leflunomide every morning.    - She will follow-up in rheumatology in 2 to 3 months. Try to stop the prednisone 1mg a day -     2. Seizure disorder, doing well on Keppra. 3.  Status post benign brain tumor removed in 2011.    4.  Significant osteoporosis. Hold On alendronate 70 mg weekly,   She will see if she needs more dental work   Consider prolia on her next visit. Since she has diarreha with alendroante. 5. Tarsal tunnel syndrome -     Summary  Cont. Methotrexate 8 tablets a week   Cont. Lelfunomide 10mg a day   Cont. Folic acid 1mg a day   Ok to try to stop prednisone 1mg a day   Hold alendroante for now - wait to see what the dentist says about more dental work  Consider prolia in the future on next visit       calcium, and vitamin D. Increase weightbearing exercise. Migdalia Schmitt MD  10/19/2023   11:49 AM      Total time spent caring for the patient on the day of the encounter:  40 min  This includes pre-charting, reviewing and obtaining results, exam, plan, notes, and counseling.

## 2024-01-08 ENCOUNTER — HOSPITAL ENCOUNTER (OUTPATIENT)
Dept: MAMMOGRAPHY | Age: 64
Discharge: HOME | End: 2024-01-08
Attending: INTERNAL MEDICINE
Payer: COMMERCIAL

## 2024-01-08 DIAGNOSIS — Z12.31 ENCOUNTER FOR SCREENING MAMMOGRAM FOR MALIGNANT NEOPLASM OF BREAST: ICD-10-CM

## 2024-01-08 PROCEDURE — 77067 SCR MAMMO BI INCL CAD: CPT | Performed by: INTERNAL MEDICINE

## 2024-01-08 PROCEDURE — 77063 BREAST TOMOSYNTHESIS BI: CPT | Performed by: INTERNAL MEDICINE

## 2024-01-16 ENCOUNTER — TELEPHONE (OUTPATIENT)
Dept: RHEUMATOLOGY | Facility: CLINIC | Age: 64
End: 2024-01-16

## 2024-01-16 NOTE — TELEPHONE ENCOUNTER
Calling patient to rescheduled appointment on 02/22/24, next appointment available not until May in [place], patient requesting sooner appointment. Please advise.

## 2024-01-16 NOTE — TELEPHONE ENCOUNTER
Pt contacted and appointment scheduled.       Future Appointments   Date Time Provider Department Center   3/14/2024  9:20 AM Palaniswami, Purani, MD [facility] [place]

## 2024-03-12 ENCOUNTER — LAB ENCOUNTER (OUTPATIENT)
Dept: LAB | Age: 64
End: 2024-03-12
Attending: INTERNAL MEDICINE
Payer: COMMERCIAL

## 2024-03-12 DIAGNOSIS — Z51.81 THERAPEUTIC DRUG MONITORING: ICD-10-CM

## 2024-03-12 DIAGNOSIS — M05.79 RHEUMATOID ARTHRITIS INVOLVING MULTIPLE SITES WITH POSITIVE RHEUMATOID FACTOR (HCC): ICD-10-CM

## 2024-03-12 LAB
ALT SERPL-CCNC: 35 U/L
AST SERPL-CCNC: 25 U/L (ref ?–34)
CREAT BLD-MCNC: 0.67 MG/DL
CRP SERPL-MCNC: <0.4 MG/DL (ref ?–1)
DEPRECATED RDW RBC AUTO: 47.8 FL (ref 35.1–46.3)
EGFRCR SERPLBLD CKD-EPI 2021: 98 ML/MIN/1.73M2 (ref 60–?)
ERYTHROCYTE [DISTWIDTH] IN BLOOD BY AUTOMATED COUNT: 13.5 % (ref 11–15)
ERYTHROCYTE [SEDIMENTATION RATE] IN BLOOD: 35 MM/HR
HCT VFR BLD AUTO: 37.9 %
HGB BLD-MCNC: 12.8 G/DL
MCH RBC QN AUTO: 33.3 PG (ref 26–34)
MCHC RBC AUTO-ENTMCNC: 33.8 G/DL (ref 31–37)
MCV RBC AUTO: 98.7 FL
PLATELET # BLD AUTO: 255 10(3)UL (ref 150–450)
RBC # BLD AUTO: 3.84 X10(6)UL
WBC # BLD AUTO: 6.4 X10(3) UL (ref 4–11)

## 2024-03-12 PROCEDURE — 82565 ASSAY OF CREATININE: CPT

## 2024-03-12 PROCEDURE — 85652 RBC SED RATE AUTOMATED: CPT

## 2024-03-12 PROCEDURE — 84450 TRANSFERASE (AST) (SGOT): CPT

## 2024-03-12 PROCEDURE — 86140 C-REACTIVE PROTEIN: CPT

## 2024-03-12 PROCEDURE — 84460 ALANINE AMINO (ALT) (SGPT): CPT

## 2024-03-12 PROCEDURE — 36415 COLL VENOUS BLD VENIPUNCTURE: CPT

## 2024-03-12 PROCEDURE — 85027 COMPLETE CBC AUTOMATED: CPT

## 2024-03-14 ENCOUNTER — OFFICE VISIT (OUTPATIENT)
Dept: RHEUMATOLOGY | Facility: CLINIC | Age: 64
End: 2024-03-14

## 2024-03-14 VITALS — HEART RATE: 96 BPM | HEIGHT: 64 IN | BODY MASS INDEX: 17.35 KG/M2 | WEIGHT: 101.63 LBS

## 2024-03-14 DIAGNOSIS — Z51.81 THERAPEUTIC DRUG MONITORING: ICD-10-CM

## 2024-03-14 DIAGNOSIS — M81.0 AGE-RELATED OSTEOPOROSIS WITHOUT CURRENT PATHOLOGICAL FRACTURE: ICD-10-CM

## 2024-03-14 DIAGNOSIS — M05.79 RHEUMATOID ARTHRITIS INVOLVING MULTIPLE SITES WITH POSITIVE RHEUMATOID FACTOR (HCC): Primary | ICD-10-CM

## 2024-03-14 PROCEDURE — 99214 OFFICE O/P EST MOD 30 MIN: CPT | Performed by: INTERNAL MEDICINE

## 2024-03-14 PROCEDURE — 3008F BODY MASS INDEX DOCD: CPT | Performed by: INTERNAL MEDICINE

## 2024-03-14 RX ORDER — LEFLUNOMIDE 10 MG/1
TABLET ORAL
Qty: 90 TABLET | Refills: 1 | Status: SHIPPED | OUTPATIENT
Start: 2024-03-14

## 2024-03-14 NOTE — PATIENT INSTRUCTIONS
Cont. Methotrexate 8 tablets a week   Cont. Lelfunomide 10mg a day   Cont. Folic acid 1mg a day   For right foot pain - follow up with podiatry - dr. Park -   [address] [place] [address] [phone]    5. Check labs every 4 months.   6. Return to clinic in 4months.

## 2024-03-14 NOTE — PROGRESS NOTES
Lisa Pearce is a 63 year old female.    HPI:     Chief Complaint   Patient presents with    Follow - Up    Rheumatoid Arthritis     Rheumatoid arthritis involving multiple sites with positive rheumatoid factor (HCC)  Small amount of stiffness YANET hands and feet         I had the pleasure of seeing Lisa Pearce on 10/19/2023 for follow up.     She  is a 62-year-old patient of Dr. Donnie Franco, with CCP and RF positive rheumatoid arthritis.  She is re-establshing since Dr. Ramirez retired.     She was last seen on 6/28/2023. She remained on 1 mg of prednisone every morning, and has continued methotrexate 20 mg weekly.  Right foot x-rays show mild osteoarthritis first MTP joint, erosion head of fifth metatarsal, old fourth metatarsal fracture with minimal deformity.  Knee x-rays nonspecific sclerosis medial left tibial plateau.  No significant arthritic change.    She was started on leflunomide 10 mg a day in 5/2023.  She is tolerating it well.    Her hands and wrists are doing well.  There is minimal stiffness and soreness across her hands in the morning.  It takes several minutes to loosen.  No joint swelling in the AM.    She takes vitamin D.  Her DEXA scan was repeated 4/10/2023.  Her T-scores were -3.0 in her left femoral neck, -2.3 in her left total hip, and -3.7 in her lumbar spine.  This is 30% worse in her hip compared to 2012, and 21% worse in her lumbar spine.  She is taking alendronate 70 mg weekly on an empty stomach without ill effects.  She takes calcium and vitamin D.  She does not get a lot of weightbearing exercise.    She doesn't have stomach upset or fatigue after taking methotrexate each week.    She denies new medical or surgical problems since I saw her.  No infections.      10/19/2023  She was off alendronate since 7/2023 - for dental procedure. She got it in 9/12/2023  She's getting her implants in December.     She went off alendroante and was noted that she had diarrhea - she is not  keen on starting on it again.   She had just started in 5/2023 - .   She will see the peridenotis in December -.   She's on methotrexate 20mg a week and prednisone 1mg ad ay.   She got labs on 10/12 /2023 with dr. Franco last week.   She also just started lisiniopril for HTN     She twised her right ankle in November 2022. She has had problmes since.   She had right foot xray in 5/2023 -   She had a right ankle xray   She has trouble with balance -   The right foot is not numb - but it feels when she walks on it all day - it starts to get numb and she has trouble walking.     3/14/2024  She's a little titght and hse's ok with the arthrits.   She is workign on her implants -   While working on that - her top dentures got infected.   3 weeks ago two teeth got pulled. And getting repairing her top dentures now.   Her bottoms are in but not iplanted.   It's very painful in her mouth.   Her arthriits is ok.   Dentists gave her pain meds    Her right foot still hurting.     HISTORY:  Past Medical History:   Diagnosis Date    Brain tumor (HCC)     per NG:  craniotomy    Cyclic vomiting syndrome     Osteopenia 02/2012    per NG: osteopenia of spine per DEXA    Other and unspecified hyperlipidemia     Seizure disorder (HCC)     per NG:anti seizure medication; brain tumor, craniotomy      Social Hx Reviewed   Family Hx Reviewed     Medications (Active prior to today's visit):  Current Outpatient Medications   Medication Sig Dispense Refill    lisinopril 10 MG Oral Tab Take 1 tablet (10 mg total) by mouth daily.      methotrexate 2.5 MG Oral Tab Take 8 tablets every Tuesday with dinner. 104 tablet 1    leflunomide 10 MG Oral Tab Take one daily. 90 tablet 1    folic acid 1 MG Oral Tab Take 1 tablet (1 mg total) by mouth daily. 90 tablet 3    Calcium-Magnesium-Vitamin D (CALCIUM 500 OR) Take 1 tablet by mouth daily.      levetiracetam (KEPPRA) 750 MG Oral Tab Take 1 tablet (750 mg total) by mouth 2 (two) times daily. 180 tablet  3    Ergocalciferol (VITAMIN D OR) Take 400 mg by mouth daily.      methylPREDNISolone 4 MG Oral Tablet Therapy Pack Take as directed on package. (Patient not taking: Reported on 6/28/2023) 1 each 0    predniSONE 1 MG Oral Tab TAKE 1 TABLETS BY MOUTH EVERY MORNING (Patient not taking: Reported on 3/14/2024) 180 tablet 1    alendronate 70 MG Oral Tab Take one weekly on an empty stomach, first thing in the AM. (Patient not taking: Reported on 10/19/2023) 12 tablet 3    Multiple Vitamins-Minerals (MULTI-VITAMIN/MINERALS) Oral Tab Take 1 tablet by mouth daily. (Patient not taking: Reported on 10/19/2023)       .cmed  Allergies:  No Known Allergies      ROS:   All other ROS are negative.     PHYSICAL EXAM:   HEENT: Clear oropharynx, no oral ulcers, EOM intact, clear sclear, PERRLA, pleasant, no acute distress, no CAD, no neck tendnerness, good ROM,   No rashes  CVS: RRR, no murmurs  RS: CTAB, no crackles, no rhonchi  ABD: Soft Non tender, no HSM felt, BS positive  Joint exam:   No synovitis wrists or hands.   strength is good bilaterally. Small effusions both knees.  The balls of her feet are not tender to squeeze.    Lab Results   Component Value Date    WBC 6.4 03/12/2024    HGB 12.8 03/12/2024    HCT 37.9 03/12/2024    .0 03/12/2024    CREATSERUM 0.67 03/12/2024    ALB 4.0 06/26/2023    AST 25 03/12/2024    ALT 35 03/12/2024    ESRML 35 (H) 03/12/2024    CRP <0.40 03/12/2024 4/17/2023 - DEXA  LEFT FEMORAL NECK   BMD: 0.513 gm/sq. cm. T SCORE: -3.0 Z SCORE: -1.7      LEFT TOTAL HIP   BMD: 0.656 gm/sq. cm. T SCORE: -2.3 Z SCORE: -1.2      PA LUMBAR SPINE (L1 - L4)   BMD: 0.640 gm/sq. cm. T SCORE: -3.7 Z SCORE: -2.0           She had albs on 10/12/2023   Cmp - cr is 0.90, ast is 22, alt is 20,   Wbc is 5.8, hb is 14.3, plt are 317    5/17/2023 - right foot xray   Mild osteoarthritis arthritis first metatarsophalangeal joint.   2.                   Erosive change head of 5th metatarsal.   3.                              Minimal deformity of the head/neck 4th metatarsal perhaps from old trauma.   4.                             Demineralization.     5/17/2023 - b/jay nee xray   1.  Nonspecific sclerosis in the medial aspect of the left tibial plateau and in the underlying epiphysis, which may represent changes of subchondral insufficiency injury.  No cortical deformity or collapse of the tibial plateau.   2.  No acute osseous abnormality of the right knee.     ASSESSMENT/PLAN:     1.  CCP and RF positive rheumatoid arthritis.  Erosion right fifth MTP joint.    Controlled on methotrexate 20 mg weekly, 1 mg of prednisone and 10 mg of leflunomide every morning.    - She will follow-up in rheumatology in 2 to 3 months.    Try to stop the prednisone 1mg a day -     2.  Seizure disorder, doing well on Keppra.    3.  Status post benign brain tumor removed in 2011.    4.  Significant osteoporosis.  Hold On alendronate 70 mg weekly,   She will see if she needs more dental work   Consider prolia on her next visit. Since she has diarreha with alendroante.     5. Tarsal tunnel syndrome -     Summary  Cont. Methotrexate 8 tablets a week   Cont. Lelfunomide 10mg a day   Cont. Folic acid 1mg a day   For right foot pain - follow up with podiatry - dr. Park -   [address] [place] [address] [phone]    5. Check labs every 4 months.   6. Return to clinic in 4months.         - Hold alendroante for now - wait to see what the dentist says about more dental work  - Consider prolia in the future on next visit       calcium, and vitamin D. Increase weightbearing exercise.      Jose Peters MD  3/14/2024   10:04 AM

## 2024-04-01 NOTE — TELEPHONE ENCOUNTER
Requested Prescriptions     Pending Prescriptions Disp Refills    methotrexate 2.5 MG Oral Tab [Pharmacy Med Name: METHOTREXATE 2.5MG TABLETS - YELLOW] 104 tablet 1     Sig: TAKE 8 TABLETS BY MOUTH EVERY TUESDAY WITH DINNER     Future Appointments   Date Time Provider Department Center   7/18/2024 11:20 AM Jose Peters MD [facility] [place]     LOV: 3/14/24  Last Refilled:10/19/23 #104 1RF  Labs:  Component      Latest Ref Rng 3/12/2024   WBC      4.0 - 11.0 x10(3) uL 6.4    RBC      3.80 - 5.30 x10(6)uL 3.84    Hemoglobin      12.0 - 16.0 g/dL 12.8    Hematocrit      35.0 - 48.0 % 37.9    MCV      80.0 - 100.0 fL 98.7    MCH      26.0 - 34.0 pg 33.3    MCHC      31.0 - 37.0 g/dL 33.8    RDW      11.0 - 15.0 % 13.5    RDW-SD      35.1 - 46.3 fL 47.8 (H)    Platelet Count      150.0 - 450.0 10(3)uL 255.0    CREATININE      0.55 - 1.02 mg/dL 0.67    EGFR      >=60 mL/min/1.73m2 98    ALT (SGPT)      10 - 49 U/L 35    SED RATE      0 - 30 mm/Hr 35 (H)    C-REACTIVE PROTEIN      <1.00 mg/dL <0.40    AST (SGOT)      <=34 U/L 25       Legend:  (H) High    Summary  Cont. Methotrexate 8 tablets a week   Cont. Lelfunomide 10mg a day   Cont. Folic acid 1mg a day   For right foot pain - follow up with podiatry - dr. Park -   [address] [place], [place] [id number] [phone]     5. Check labs every 4 months.   6. Return to clinic in 4months.            - Hold gaurangante for now - wait to see what the dentist says about more dental work  - Consider prolia in the future on next visit         calcium, and vitamin D. Increase weightbearing exercise.       Jose Peters MD  3/14/2024   10:04 AM

## 2024-04-02 RX ORDER — METHOTREXATE 2.5 MG/1
TABLET ORAL
Qty: 104 TABLET | Refills: 1 | Status: SHIPPED | OUTPATIENT
Start: 2024-04-02

## 2024-06-24 NOTE — TELEPHONE ENCOUNTER
LOV: 3/14/24  Last Refilled:#90, 3rfs 6/28/23    Summary  Cont. Methotrexate 8 tablets a week   Cont. Lelfunomide 10mg a day   Cont. Folic acid 1mg a day   For right foot pain - follow up with podiatry - dr. Park -   [address] [place] [address]  [phone]     5. Check labs every 4 months.   6. Return to clinic in 4months.            - Hold alendroante for now - wait to see what the dentist says about more dental work  - Consider prolia in the future on next visit         calcium, and vitamin D. Increase weightbearing exercise.       Jose Peters MD  3/14/2024   10:04 AM           Please advise.

## 2024-06-25 RX ORDER — FOLIC ACID 1 MG/1
1 TABLET ORAL DAILY
Qty: 90 TABLET | Refills: 3 | Status: SHIPPED | OUTPATIENT
Start: 2024-06-25

## 2024-07-05 NOTE — TELEPHONE ENCOUNTER
LOV: 3/14/24  Last Refilled:#90, 3rfs  5/17/23    Summary  Cont. Methotrexate 8 tablets a week   Cont. Lelfunomide 10mg a day   Cont. Folic acid 1mg a day   For right foot pain - follow up with podiatry - dr. Park -   [address] [place] [address]  [phone]     5. Check labs every 4 months.   6. Return to clinic in 4months.            - Hold alendroante for now - wait to see what the dentist says about more dental work  - Consider prolia in the future on next visit         calcium, and vitamin D. Increase weightbearing exercise.       Jose Peters MD  3/14/2024   10:04 AM              Please advise.

## 2024-07-08 RX ORDER — PREDNISONE 1 MG/1
TABLET ORAL
Qty: 90 TABLET | Refills: 0 | Status: SHIPPED | OUTPATIENT
Start: 2024-07-08

## 2024-07-17 ENCOUNTER — LAB ENCOUNTER (OUTPATIENT)
Dept: LAB | Age: 64
End: 2024-07-17
Attending: INTERNAL MEDICINE
Payer: MEDICARE

## 2024-07-17 DIAGNOSIS — Z51.81 THERAPEUTIC DRUG MONITORING: ICD-10-CM

## 2024-07-17 DIAGNOSIS — M05.79 RHEUMATOID ARTHRITIS INVOLVING MULTIPLE SITES WITH POSITIVE RHEUMATOID FACTOR (HCC): ICD-10-CM

## 2024-07-17 LAB
ALT SERPL-CCNC: 22 U/L
AST SERPL-CCNC: 26 U/L (ref ?–34)
CREAT BLD-MCNC: 0.75 MG/DL
CRP SERPL-MCNC: <0.4 MG/DL (ref ?–1)
DEPRECATED RDW RBC AUTO: 49.5 FL (ref 35.1–46.3)
EGFRCR SERPLBLD CKD-EPI 2021: 88 ML/MIN/1.73M2 (ref 60–?)
ERYTHROCYTE [DISTWIDTH] IN BLOOD BY AUTOMATED COUNT: 13.8 % (ref 11–15)
ERYTHROCYTE [SEDIMENTATION RATE] IN BLOOD: 16 MM/HR
HCT VFR BLD AUTO: 41.7 %
HGB BLD-MCNC: 13.8 G/DL
MCH RBC QN AUTO: 32.8 PG (ref 26–34)
MCHC RBC AUTO-ENTMCNC: 33.1 G/DL (ref 31–37)
MCV RBC AUTO: 99 FL
PLATELET # BLD AUTO: 284 10(3)UL (ref 150–450)
RBC # BLD AUTO: 4.21 X10(6)UL
WBC # BLD AUTO: 7.8 X10(3) UL (ref 4–11)

## 2024-07-17 PROCEDURE — 84450 TRANSFERASE (AST) (SGOT): CPT

## 2024-07-17 PROCEDURE — 36415 COLL VENOUS BLD VENIPUNCTURE: CPT

## 2024-07-17 PROCEDURE — 84460 ALANINE AMINO (ALT) (SGPT): CPT

## 2024-07-17 PROCEDURE — 85652 RBC SED RATE AUTOMATED: CPT

## 2024-07-17 PROCEDURE — 86140 C-REACTIVE PROTEIN: CPT

## 2024-07-17 PROCEDURE — 82565 ASSAY OF CREATININE: CPT

## 2024-07-17 PROCEDURE — 85027 COMPLETE CBC AUTOMATED: CPT

## 2024-07-18 ENCOUNTER — OFFICE VISIT (OUTPATIENT)
Dept: RHEUMATOLOGY | Facility: CLINIC | Age: 64
End: 2024-07-18

## 2024-07-18 VITALS
WEIGHT: 103.38 LBS | OXYGEN SATURATION: 95 % | HEIGHT: 64 IN | BODY MASS INDEX: 17.65 KG/M2 | DIASTOLIC BLOOD PRESSURE: 82 MMHG | SYSTOLIC BLOOD PRESSURE: 124 MMHG | HEART RATE: 103 BPM | RESPIRATION RATE: 16 BRPM

## 2024-07-18 DIAGNOSIS — Z51.81 THERAPEUTIC DRUG MONITORING: ICD-10-CM

## 2024-07-18 DIAGNOSIS — M05.79 RHEUMATOID ARTHRITIS INVOLVING MULTIPLE SITES WITH POSITIVE RHEUMATOID FACTOR (HCC): Primary | ICD-10-CM

## 2024-07-18 DIAGNOSIS — M81.0 AGE-RELATED OSTEOPOROSIS WITHOUT CURRENT PATHOLOGICAL FRACTURE: ICD-10-CM

## 2024-07-18 PROCEDURE — 99214 OFFICE O/P EST MOD 30 MIN: CPT | Performed by: INTERNAL MEDICINE

## 2024-07-18 PROCEDURE — 3008F BODY MASS INDEX DOCD: CPT | Performed by: INTERNAL MEDICINE

## 2024-07-18 PROCEDURE — 3074F SYST BP LT 130 MM HG: CPT | Performed by: INTERNAL MEDICINE

## 2024-07-18 PROCEDURE — 3079F DIAST BP 80-89 MM HG: CPT | Performed by: INTERNAL MEDICINE

## 2024-07-18 PROCEDURE — G2211 COMPLEX E/M VISIT ADD ON: HCPCS | Performed by: INTERNAL MEDICINE

## 2024-07-18 NOTE — PROGRESS NOTES
Lisa Pearce is a 64 year old female.    HPI:     Chief Complaint   Patient presents with    Rheumatoid Arthritis    Medication Follow-Up    Lab Results    Leg Pain     B/l    Foot Pain    Knee Pain     B/L         I had the pleasure of seeing Lisa Pearce on 10/19/2023 for follow up.     She  is a 62-year-old patient of Dr. Donnie Franco, with CCP and RF positive rheumatoid arthritis.  She is re-establshing since Dr. Ramirez retired.     She was last seen on 6/28/2023. She remained on 1 mg of prednisone every morning, and has continued methotrexate 20 mg weekly.  Right foot x-rays show mild osteoarthritis first MTP joint, erosion head of fifth metatarsal, old fourth metatarsal fracture with minimal deformity.  Knee x-rays nonspecific sclerosis medial left tibial plateau.  No significant arthritic change.    She was started on leflunomide 10 mg a day in 5/2023.  She is tolerating it well.    Her hands and wrists are doing well.  There is minimal stiffness and soreness across her hands in the morning.  It takes several minutes to loosen.  No joint swelling in the AM.    She takes vitamin D.  Her DEXA scan was repeated 4/10/2023.  Her T-scores were -3.0 in her left femoral neck, -2.3 in her left total hip, and -3.7 in her lumbar spine.  This is 30% worse in her hip compared to 2012, and 21% worse in her lumbar spine.  She is taking alendronate 70 mg weekly on an empty stomach without ill effects.  She takes calcium and vitamin D.  She does not get a lot of weightbearing exercise.    She doesn't have stomach upset or fatigue after taking methotrexate each week.    She denies new medical or surgical problems since I saw her.  No infections.      10/19/2023  She was off alendronate since 7/2023 - for dental procedure. She got it in 9/12/2023  She's getting her implants in December.     She went off alendroante and was noted that she had diarrhea - she is not keen on starting on it again.   She had just started in  5/2023 - .   She will see the peridenotis in December -.   She's on methotrexate 20mg a week and prednisone 1mg ad ay.   She got labs on 10/12 /2023 with dr. Franco last week.   She also just started lisiniopril for HTN     She twised her right ankle in November 2022. She has had problmes since.   She had right foot xray in 5/2023 -   She had a right ankle xray   She has trouble with balance -   The right foot is not numb - but it feels when she walks on it all day - it starts to get numb and she has trouble walking.     3/14/2024  She's a little titght and hse's ok with the arthrits.   She is workign on her implants -   While working on that - her top dentures got infected.   3 weeks ago two teeth got pulled. And getting repairing her top dentures now.   Her bottoms are in but not iplanted.   It's very painful in her mouth.   Her arthriits is ok.   Dentists gave her pain meds    Her right foot still hurting.     7/18/2024  She is now on medicare - it's [employer] -   She is not feeling better - but today it sa good ay.   Still hasn't had her right foot looked at b/c she is getting her insurance card cmoing in -   She has a cane but she has a limp on her right foot - she thinks she broke it and it didn't medn it right   She is waiting to see podiatry with her new insurance.   Her left thumb is getting trigger finger in the last 4-6 weeks -   She started prednisoine 1mg ad ay - back again - b/c it helps more -     She is still getting dental work- she didn't have the bottom dentures in yet - so going back in 2 weeks -     HISTORY:  Past Medical History:    Brain tumor (HCC)    per NG:  craniotomy    Cyclic vomiting syndrome    Osteopenia    per NG: osteopenia of spine per DEXA    Other and unspecified hyperlipidemia    Seizure disorder (HCC)    per NG:anti seizure medication; brain tumor, craniotomy      Social Hx Reviewed   Family Hx Reviewed     Medications (Active prior to today's visit):  Current Outpatient  Medications   Medication Sig Dispense Refill    predniSONE 1 MG Oral Tab TAKE 1 TABLETS BY MOUTH EVERY MORNING 90 tablet 0    folic acid 1 MG Oral Tab Take 1 tablet (1 mg total) by mouth daily. 90 tablet 3    methotrexate 2.5 MG Oral Tab TAKE 8 TABLETS BY MOUTH EVERY TUESDAY WITH DINNER 104 tablet 1    leflunomide 10 MG Oral Tab Take one daily. 90 tablet 1    lisinopril 10 MG Oral Tab Take 1 tablet (10 mg total) by mouth daily.      Calcium-Magnesium-Vitamin D (CALCIUM 500 OR) Take 1 tablet by mouth daily.      levetiracetam (KEPPRA) 750 MG Oral Tab Take 1 tablet (750 mg total) by mouth 2 (two) times daily. 180 tablet 3    Ergocalciferol (VITAMIN D OR) Take 400 mg by mouth daily.      methylPREDNISolone 4 MG Oral Tablet Therapy Pack Take as directed on package. (Patient not taking: Reported on 6/28/2023) 1 each 0    alendronate 70 MG Oral Tab Take one weekly on an empty stomach, first thing in the AM. (Patient not taking: Reported on 10/19/2023) 12 tablet 3    Multiple Vitamins-Minerals (MULTI-VITAMIN/MINERALS) Oral Tab Take 1 tablet by mouth daily. (Patient not taking: Reported on 10/19/2023)       .cmed  Allergies:  Allergies   Allergen Reactions    Alendronic Acid OTHER (SEE COMMENTS)         ROS:   All other ROS are negative.     PHYSICAL EXAM:   HEENT: Clear oropharynx, no oral ulcers, EOM intact, clear sclear, PERRLA, pleasant, no acute distress, no CAD, no neck tendnerness, good ROM,   No rashes  CVS: RRR, no murmurs  RS: CTAB, no crackles, no rhonchi  ABD: Soft Non tender, no HSM felt, BS positive  Joint exam:   No synovitis wrists or hands.   strength is good bilaterally. Small effusions both knees.  The balls of her feet are not tender to squeeze.    Component      Latest Ref Rng 7/17/2024   WBC      4.0 - 11.0 x10(3) uL 7.8    RBC      3.80 - 5.30 x10(6)uL 4.21    Hemoglobin      12.0 - 16.0 g/dL 13.8    Hematocrit      35.0 - 48.0 % 41.7    MCV      80.0 - 100.0 fL 99.0    MCH      26.0 - 34.0 pg  32.8    MCHC      31.0 - 37.0 g/dL 33.1    RDW      11.0 - 15.0 % 13.8    RDW-SD      35.1 - 46.3 fL 49.5 (H)    Platelet Count      150.0 - 450.0 10(3)uL 284.0    CREATININE      0.55 - 1.02 mg/dL 0.75    EGFR      >=60 mL/min/1.73m2 88    ALT (SGPT)      10 - 49 U/L 22    SED RATE      0 - 30 mm/Hr 16    C-REACTIVE PROTEIN      <1.00 mg/dL <0.40    AST (SGOT)      <34 U/L 26       Legend:  (H) High  4/17/2023 - DEXA  LEFT FEMORAL NECK   BMD: 0.513 gm/sq. cm. T SCORE: -3.0 Z SCORE: -1.7      LEFT TOTAL HIP   BMD: 0.656 gm/sq. cm. T SCORE: -2.3 Z SCORE: -1.2      PA LUMBAR SPINE (L1 - L4)   BMD: 0.640 gm/sq. cm. T SCORE: -3.7 Z SCORE: -2.0           She had albs on 10/12/2023   Cmp - cr is 0.90, ast is 22, alt is 20,   Wbc is 5.8, hb is 14.3, plt are 317    5/17/2023 - right foot xray   Mild osteoarthritis arthritis first metatarsophalangeal joint.   2.                   Erosive change head of 5th metatarsal.   3.                             Minimal deformity of the head/neck 4th metatarsal perhaps from old trauma.   4.                             Demineralization.     5/17/2023 - b/lk nee xray   1.  Nonspecific sclerosis in the medial aspect of the left tibial plateau and in the underlying epiphysis, which may represent changes of subchondral insufficiency injury.  No cortical deformity or collapse of the tibial plateau.   2.  No acute osseous abnormality of the right knee.     ASSESSMENT/PLAN:     1.  CCP and RF positive rheumatoid arthritis.  Erosion right fifth MTP joint.    Controlled on methotrexate 20 mg weekly, 1 mg of prednisone and 10 mg of leflunomide every morning.    - She will follow-up in rheumatology in 4 months.  , labs every 3-4 months.   Cont.prednisone 1mg a day -     2.  Seizure disorder, doing well on Keppra.    3.  Status post benign brain tumor removed in 2011.    4.  Significant osteoporosis.  Hold On alendronate 70 mg weekly,   She will see if she needs more dental work   Consider prolia on  her next visit. Since she has diarreha with alendroante.   Due for DEXA in 4/2025 -  Wait for dental work to be done     5. Tarsal tunnel syndrome - refer to podiatry - for right foot pain     Summary  Cont. Methotrexate 8 tablets a week   Cont. Lelfunomide 10mg a day   Cont. Folic acid 1mg a day   For right foot pain - follow up with podiatry -   [address] [place] [address]  [phone]    5. Check labs every 4 months.   6. Return to clinic in 4months.   7. Cont. Prednisone 1mg ad ay         - Hold alendroante for now - wait to see what the dentist says about more dental work  - Consider prolia in the future on next visit       calcium, and vitamin D. Increase weightbearing exercise.      Jose Peters MD  7/18/2024   11:53 AM       is applicable because the patient's medical record notes reflects the ongoing nature of the continuous longitudinal relationship of care, and the medical record indicates that there is ongoing treatment of a serious/complex medical condition.

## 2024-07-18 NOTE — PATIENT INSTRUCTIONS
Summary  Cont. Methotrexate 8 tablets a week   Cont. Lelfunomide 10mg a day   Cont. Folic acid 1mg a day   For right foot pain - follow up with podiatry -   [address] [place] [address] [phone]    5. Check labs every 4 months.   6. Return to clinic in 4months.   7. Cont. Prednisone 1mg ad ay

## 2024-08-23 ENCOUNTER — HOSPITAL ENCOUNTER (OUTPATIENT)
Dept: GENERAL RADIOLOGY | Age: 64
Discharge: HOME | End: 2024-08-23
Attending: INTERNAL MEDICINE
Payer: MEDICARE

## 2024-08-23 DIAGNOSIS — M21.372 BILATERAL FOOT-DROP: ICD-10-CM

## 2024-08-23 DIAGNOSIS — M21.371 BILATERAL FOOT-DROP: ICD-10-CM

## 2024-08-23 PROCEDURE — 73630 X-RAY EXAM OF FOOT: CPT | Performed by: INTERNAL MEDICINE

## 2024-10-02 NOTE — TELEPHONE ENCOUNTER
LOV: 7/18/24  Future Appointments   Date Time Provider Department Center   12/5/2024  2:50 PM Jose Peters MD [facility] [place]     Labs: AST 26 ALT 22  7/17/24  Summary  Cont. Methotrexate 8 tablets a week   Cont. Lelfunomide 10mg a day   Cont. Folic acid 1mg a day   For right foot pain - follow up with podiatry -   [address] [place] [address]  [phone]     5. Check labs every 4 months.   6. Return to clinic in 4months.   7. Cont. Prednisone 1mg ad ay            - Hold jacquesndroante for now - wait to see what the dentist says about more dental work  - Consider prolia in the future on next visit         calcium, and vitamin D. Increase weightbearing exercise.       Jose Peters MD  7/18/2024   11:53 AM

## 2024-10-03 RX ORDER — METHOTREXATE 2.5 MG/1
TABLET ORAL
Qty: 104 TABLET | Refills: 1 | Status: SHIPPED | OUTPATIENT
Start: 2024-10-03

## 2024-10-15 NOTE — TELEPHONE ENCOUNTER
Requested Prescriptions     Pending Prescriptions Disp Refills    predniSONE 1 MG Oral Tab [Pharmacy Med Name: PREDNISONE 1MG TABLETS] 90 tablet 0     Sig: TAKE 1 TABLET BY MOUTH EVERY MORNING     Future Appointments   Date Time Provider Department Center   12/5/2024  2:50 PM Jose Peters MD [facility] [place]      LOV: 7/18/24   Last Refilled:7/8/24 #90 0RF   Labs:  Component      Latest Ref Rng 7/17/2024   WBC      4.0 - 11.0 x10(3) uL 7.8    RBC      3.80 - 5.30 x10(6)uL 4.21    Hemoglobin      12.0 - 16.0 g/dL 13.8    Hematocrit      35.0 - 48.0 % 41.7    MCV      80.0 - 100.0 fL 99.0    MCH      26.0 - 34.0 pg 32.8    MCHC      31.0 - 37.0 g/dL 33.1    RDW      11.0 - 15.0 % 13.8    RDW-SD      35.1 - 46.3 fL 49.5 (H)    Platelet Count      150.0 - 450.0 10(3)uL 284.0    CREATININE      0.55 - 1.02 mg/dL 0.75    EGFR      >=60 mL/min/1.73m2 88    ALT (SGPT)      10 - 49 U/L 22    SED RATE      0 - 30 mm/Hr 16    C-REACTIVE PROTEIN      <1.00 mg/dL <0.40    AST (SGOT)      <34 U/L 26       Legend:  (H) High    Summary  Cont. Methotrexate 8 tablets a week   Cont. Lelfunomide 10mg a day   Cont. Folic acid 1mg a day   For right foot pain - follow up with podiatry -   [address] [place] [address] [phone]     5. Check labs every 4 months.   6. Return to clinic in 4months.   7. Cont. Prednisone 1mg ad ay            - Hold alendroante for now - wait to see what the dentist says about more dental work  - Consider prolia in the future on next visit         calcium, and vitamin D. Increase weightbearing exercise.       Jose Peters MD  7/18/2024   11:53 AM

## 2024-10-16 RX ORDER — PREDNISONE 1 MG/1
TABLET ORAL
Qty: 90 TABLET | Refills: 0 | Status: SHIPPED | OUTPATIENT
Start: 2024-10-16

## 2024-11-08 DIAGNOSIS — M05.79 RHEUMATOID ARTHRITIS INVOLVING MULTIPLE SITES WITH POSITIVE RHEUMATOID FACTOR (HCC): Primary | ICD-10-CM

## 2024-11-08 NOTE — TELEPHONE ENCOUNTER
LOV: 7/18/24  Future Appointments   Date Time Provider Department Center   12/5/2024  2:50 PM Jose Peters MD [facility] [place]     Labs: AST 26 ALT 22 7/17/24  Summary  Cont. Methotrexate 8 tablets a week   Cont. Lelfunomide 10mg a day   Cont. Folic acid 1mg a day   For right foot pain - follow up with podiatry -   [address] [place] [address] [phone]     5. Check labs every 4 months.   6. Return to clinic in 4months.   7. Cont. Prednisone 1mg ad ay            - Hold alendroante for now - wait to see what the dentist says about more dental work  - Consider prolia in the future on next visit         calcium, and vitamin D. Increase weightbearing exercise.       Jose Peters MD  7/18/2024   11:53 AM         is applicabl

## 2024-11-09 RX ORDER — LEFLUNOMIDE 10 MG/1
TABLET ORAL
Qty: 90 TABLET | Refills: 0 | Status: SHIPPED | OUTPATIENT
Start: 2024-11-09

## 2024-12-02 ENCOUNTER — LAB ENCOUNTER (OUTPATIENT)
Dept: LAB | Age: 64
End: 2024-12-02
Attending: INTERNAL MEDICINE
Payer: MEDICARE

## 2024-12-02 DIAGNOSIS — Z51.81 THERAPEUTIC DRUG MONITORING: ICD-10-CM

## 2024-12-02 DIAGNOSIS — M05.79 RHEUMATOID ARTHRITIS INVOLVING MULTIPLE SITES WITH POSITIVE RHEUMATOID FACTOR (HCC): ICD-10-CM

## 2024-12-02 LAB
ALT SERPL-CCNC: 25 U/L
AST SERPL-CCNC: 22 U/L (ref ?–34)
CREAT BLD-MCNC: 0.73 MG/DL
CRP SERPL-MCNC: <0.4 MG/DL (ref ?–1)
DEPRECATED RDW RBC AUTO: 47.5 FL (ref 35.1–46.3)
EGFRCR SERPLBLD CKD-EPI 2021: 91 ML/MIN/1.73M2 (ref 60–?)
ERYTHROCYTE [DISTWIDTH] IN BLOOD BY AUTOMATED COUNT: 13.1 % (ref 11–15)
ERYTHROCYTE [SEDIMENTATION RATE] IN BLOOD: 20 MM/HR
HCT VFR BLD AUTO: 39.7 %
HGB BLD-MCNC: 12.9 G/DL
MCH RBC QN AUTO: 32.5 PG (ref 26–34)
MCHC RBC AUTO-ENTMCNC: 32.5 G/DL (ref 31–37)
MCV RBC AUTO: 100 FL
PLATELET # BLD AUTO: 257 10(3)UL (ref 150–450)
RBC # BLD AUTO: 3.97 X10(6)UL
WBC # BLD AUTO: 6.1 X10(3) UL (ref 4–11)

## 2024-12-02 PROCEDURE — 82565 ASSAY OF CREATININE: CPT

## 2024-12-02 PROCEDURE — 85027 COMPLETE CBC AUTOMATED: CPT

## 2024-12-02 PROCEDURE — 84460 ALANINE AMINO (ALT) (SGPT): CPT

## 2024-12-02 PROCEDURE — 84450 TRANSFERASE (AST) (SGOT): CPT

## 2024-12-02 PROCEDURE — 86140 C-REACTIVE PROTEIN: CPT

## 2024-12-02 PROCEDURE — 85652 RBC SED RATE AUTOMATED: CPT

## 2024-12-02 PROCEDURE — 36415 COLL VENOUS BLD VENIPUNCTURE: CPT

## 2024-12-05 ENCOUNTER — LAB ENCOUNTER (OUTPATIENT)
Dept: LAB | Age: 64
End: 2024-12-05
Attending: INTERNAL MEDICINE
Payer: MEDICARE

## 2024-12-05 ENCOUNTER — OFFICE VISIT (OUTPATIENT)
Dept: RHEUMATOLOGY | Facility: CLINIC | Age: 64
End: 2024-12-05

## 2024-12-05 VITALS — HEART RATE: 107 BPM | HEIGHT: 64 IN | WEIGHT: 105 LBS | BODY MASS INDEX: 17.93 KG/M2

## 2024-12-05 DIAGNOSIS — G62.9 NEUROPATHY: ICD-10-CM

## 2024-12-05 DIAGNOSIS — M21.372 BILATERAL FOOT-DROP: ICD-10-CM

## 2024-12-05 DIAGNOSIS — Z51.81 THERAPEUTIC DRUG MONITORING: ICD-10-CM

## 2024-12-05 DIAGNOSIS — M05.79 RHEUMATOID ARTHRITIS INVOLVING MULTIPLE SITES WITH POSITIVE RHEUMATOID FACTOR (HCC): ICD-10-CM

## 2024-12-05 DIAGNOSIS — M05.79 RHEUMATOID ARTHRITIS INVOLVING MULTIPLE SITES WITH POSITIVE RHEUMATOID FACTOR (HCC): Primary | ICD-10-CM

## 2024-12-05 DIAGNOSIS — M21.371 BILATERAL FOOT-DROP: ICD-10-CM

## 2024-12-05 LAB
HBV CORE AB SERPL QL IA: NONREACTIVE
HBV SURFACE AG SER-ACNC: <0.1 [IU]/L
HBV SURFACE AG SERPL QL IA: NONREACTIVE
HCV AB SERPL QL IA: NONREACTIVE

## 2024-12-05 PROCEDURE — 3008F BODY MASS INDEX DOCD: CPT | Performed by: INTERNAL MEDICINE

## 2024-12-05 PROCEDURE — 86704 HEP B CORE ANTIBODY TOTAL: CPT

## 2024-12-05 PROCEDURE — 86803 HEPATITIS C AB TEST: CPT

## 2024-12-05 PROCEDURE — G2211 COMPLEX E/M VISIT ADD ON: HCPCS | Performed by: INTERNAL MEDICINE

## 2024-12-05 PROCEDURE — 86480 TB TEST CELL IMMUN MEASURE: CPT

## 2024-12-05 PROCEDURE — 99215 OFFICE O/P EST HI 40 MIN: CPT | Performed by: INTERNAL MEDICINE

## 2024-12-05 PROCEDURE — 36415 COLL VENOUS BLD VENIPUNCTURE: CPT

## 2024-12-05 PROCEDURE — 87340 HEPATITIS B SURFACE AG IA: CPT

## 2024-12-05 RX ORDER — LISINOPRIL 20 MG/1
20 TABLET ORAL DAILY
COMMUNITY
Start: 2024-11-10

## 2024-12-05 NOTE — PATIENT INSTRUCTIONS
Cont. Methotrexate 8 tablets a week   Cont. Lelfunomide 10mg a day   Cont. Folic acid 1mg a day   Make appt. With dr. Martines    5. Consider sural nerve biopsy -   6. Consider rituximab or ivig as treatment options - for rheumatoid vasculitis causing neuropathy or other reasons for it   7. Check labs every 4 months.   8. Return to clinic in 1 months - 1/9 - at 4:10 pm   9. Cont. Prednisone 1mg ad ay

## 2024-12-05 NOTE — PROGRESS NOTES
Lisa Pearce is a 64 year old female.    HPI:     Chief Complaint   Patient presents with    Rheumatoid Arthritis         I had the pleasure of seeing Lisa Pearce on 10/19/2023 for follow up.     She  is a 62-year-old patient of Dr. Donnie Franco, with CCP and RF positive rheumatoid arthritis.  She is re-establshing since Dr. Ramirez retired.     She was last seen on 6/28/2023. She remained on 1 mg of prednisone every morning, and has continued methotrexate 20 mg weekly.  Right foot x-rays show mild osteoarthritis first MTP joint, erosion head of fifth metatarsal, old fourth metatarsal fracture with minimal deformity.  Knee x-rays nonspecific sclerosis medial left tibial plateau.  No significant arthritic change.    She was started on leflunomide 10 mg a day in 5/2023.  She is tolerating it well.    Her hands and wrists are doing well.  There is minimal stiffness and soreness across her hands in the morning.  It takes several minutes to loosen.  No joint swelling in the AM.    She takes vitamin D.  Her DEXA scan was repeated 4/10/2023.  Her T-scores were -3.0 in her left femoral neck, -2.3 in her left total hip, and -3.7 in her lumbar spine.  This is 30% worse in her hip compared to 2012, and 21% worse in her lumbar spine.  She is taking alendronate 70 mg weekly on an empty stomach without ill effects.  She takes calcium and vitamin D.  She does not get a lot of weightbearing exercise.    She doesn't have stomach upset or fatigue after taking methotrexate each week.    She denies new medical or surgical problems since I saw her.  No infections.      10/19/2023  She was off alendronate since 7/2023 - for dental procedure. She got it in 9/12/2023  She's getting her implants in December.     She went off alendroante and was noted that she had diarrhea - she is not keen on starting on it again.   She had just started in 5/2023 - .   She will see the peridenotis in December -.   She's on methotrexate 20mg a week  and prednisone 1mg ad ay.   She got labs on 10/12 /2023 with dr. Franco last week.   She also just started lisiniopril for HTN     She twised her right ankle in November 2022. She has had problmes since.   She had right foot xray in 5/2023 -   She had a right ankle xray   She has trouble with balance -   The right foot is not numb - but it feels when she walks on it all day - it starts to get numb and she has trouble walking.     3/14/2024  She's a little titght and hse's ok with the arthrits.   She is workign on her implants -   While working on that - her top dentures got infected.   3 weeks ago two teeth got pulled. And getting repairing her top dentures now.   Her bottoms are in but not iplanted.   It's very painful in her mouth.   Her arthriits is ok.   Dentists gave her pain meds    Her right foot still hurting.     7/18/2024  She is now on medicare - it's [employer] -   She is not feeling better - but today it sa good ay.   Still hasn't had her right foot looked at b/c she is getting her insurance card cmoing in -   She has a cane but she has a limp on her right foot - she thinks she broke it and it didn't medn it right   She is waiting to see podiatry with her new insurance.   Her left thumb is getting trigger finger in the last 4-6 weeks -   She started prednisoine 1mg ad ay - back again - b/c it helps more -     She is still getting dental work- she didn't have the bottom dentures in yet - so going back in 2 weeks -     12/5/2024  She's has a drop foot in her right foot and left foot - dx in 10/2024.   Mainly it was her right foot   She had her neurologist look at it.   She has b/l leg braces  Xrays were negative for fractures   She had emg/ncv studies with dr. Martines on 8/2/2024 - Conclusion: abnormal study. 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 No lumbosacral radiculopathy or myopathy.     Over 1 year she injured her right foot.   But her  balance was off this July. 2024.   She sees dr. Allen once a year fo rher seizures -   Sne's not sure when her drop foot started but likely around July.   She had emg studeis showsin axonal polyneuropathy in both feet -     The last couple of week maurice hands are bothering her.     HISTORY:  Past Medical History:    Brain tumor (HCC)    per NG:  craniotomy    Cyclic vomiting syndrome    Osteopenia    per NG: osteopenia of spine per DEXA    Other and unspecified hyperlipidemia    Seizure disorder (HCC)    per NG:anti seizure medication; brain tumor, craniotomy      Social Hx Reviewed   Family Hx Reviewed     Medications (Active prior to today's visit):  Current Outpatient Medications   Medication Sig Dispense Refill    lisinopril 20 MG Oral Tab Take 1 tablet (20 mg total) by mouth daily.      leflunomide 10 MG Oral Tab TAKE 1 TABLET BY MOUTH DAILY 90 tablet 0    predniSONE 1 MG Oral Tab TAKE 1 TABLET BY MOUTH EVERY MORNING 90 tablet 0    methotrexate 2.5 MG Oral Tab TAKE EIGHT TABLETS BY MOUTH EVERY TUESDAY WITH DINNER 104 tablet 1    folic acid 1 MG Oral Tab Take 1 tablet (1 mg total) by mouth daily. 90 tablet 3    Calcium-Magnesium-Vitamin D (CALCIUM 500 OR) Take 1 tablet by mouth daily.      levetiracetam (KEPPRA) 750 MG Oral Tab Take 1 tablet (750 mg total) by mouth 2 (two) times daily. 180 tablet 3    Ergocalciferol (VITAMIN D OR) Take 400 mg by mouth daily.      methylPREDNISolone 4 MG Oral Tablet Therapy Pack Take as directed on package. (Patient not taking: Reported on 6/28/2023) 1 each 0    alendronate 70 MG Oral Tab Take one weekly on an empty stomach, first thing in the AM. (Patient not taking: Reported on 12/5/2024) 12 tablet 3    Multiple Vitamins-Minerals (MULTI-VITAMIN/MINERALS) Oral Tab Take 1 tablet by mouth daily. (Patient not taking: Reported on 10/19/2023)       .cmed  Allergies:  Allergies   Allergen Reactions    Alendronic Acid OTHER (SEE COMMENTS)         ROS:   All other ROS are negative.      PHYSICAL EXAM:   HEENT: Clear oropharynx, no oral ulcers, EOM intact, clear sclear, PERRLA, pleasant, no acute distress, no CAD, no neck tendnerness, good ROM,   No rashes  CVS: RRR, no murmurs  RS: CTAB, no crackles, no rhonchi  ABD: Soft Non tender, no HSM felt, BS positive  Joint exam:   No synovitis wrists or hands.   strength is good bilaterally. Small effusions both knees.  The balls of her feet are not tender to squeeze.    Component      Latest Ref Rng 12/2/2024   WBC      4.0 - 11.0 x10(3) uL 6.1    RBC      3.80 - 5.30 x10(6)uL 3.97    Hemoglobin      12.0 - 16.0 g/dL 12.9    Hematocrit      35.0 - 48.0 % 39.7    MCV      80.0 - 100.0 fL 100.0    MCH      26.0 - 34.0 pg 32.5    MCHC      31.0 - 37.0 g/dL 32.5    RDW      11.0 - 15.0 % 13.1    RDW-SD      35.1 - 46.3 fL 47.5 (H)    Platelet Count      150.0 - 450.0 10(3)uL 257.0    CREATININE      0.55 - 1.02 mg/dL 0.73    EGFR      >=60 mL/min/1.73m2 91    AST (SGOT)      <34 U/L 22    C-REACTIVE PROTEIN      <1.00 mg/dL <0.40    SED RATE      0 - 30 mm/Hr 20    ALT (SGPT)      10 - 49 U/L 25         8/5/24  1:11 PM    Anca Screen  NEGATIVE NEGATIVE     8/5/24  1:11 PM    Rheumatoid Factor  <=14 IU/mL 27 High      8/5/24  1:11 PM    JENNIFER Screen  Negative Negative       8/5/24  1:11 PM    Erythrocyte Sedimentation Rate  0 - 20 mm/hr 14       8/5/24  1:11 PM    Jerri Interpretation SEE COMMENT   Comment:  Normal pattern. No monoclonal proteins detected.   Immunoglobulin A  70 - 320 mg/dL 360 High    Immunoglobulin G  600 - 1540 mg/dL 1,053   Immunoglobulin M  50 - 300 mg/dL 73       8/5/24  1:11 PM    Protein, Total  6.1 - 8.1 g/dL 7.5   Albumin  3.8 - 4.8 g/dL 4.5   Alpha 1 Globulin  0.2 - 0.3 g/dL 0.3   Alpha 2 Globulin  0.5 - 0.9 g/dL 0.7   Beta 1 Globulin  0.4 - 0.6 g/dL 0.5   Beta 2 Globulin  0.2 - 0.5 g/dL 0.5   Gamma Globulin  0.8 - 1.7 g/dL 1.0   Interpretation SEE NOTE   Comment: Normal Electrophoretic Pattern.   Kappa  176 - 443 mg/dL 266    Lambda  91 - 240 mg/dL 170   Kappa/Lambda Ratio  1.29 - 2.55 1.56   Comment:  This assay provides a measureme     (H) High  4/17/2023 - DEXA  LEFT FEMORAL NECK   BMD: 0.513 gm/sq. cm. T SCORE: -3.0 Z SCORE: -1.7      LEFT TOTAL HIP   BMD: 0.656 gm/sq. cm. T SCORE: -2.3 Z SCORE: -1.2      PA LUMBAR SPINE (L1 - L4)   BMD: 0.640 gm/sq. cm. T SCORE: -3.7 Z SCORE: -2.0           She had albs on 10/12/2023   Cmp - cr is 0.90, ast is 22, alt is 20,   Wbc is 5.8, hb is 14.3, plt are 317    5/17/2023 - right foot xray   Mild osteoarthritis arthritis first metatarsophalangeal joint.   2.                   Erosive change head of 5th metatarsal.   3.                             Minimal deformity of the head/neck 4th metatarsal perhaps from old trauma.   4.                             Demineralization.     5/17/2023 - b/lk nee xray   1.  Nonspecific sclerosis in the medial aspect of the left tibial plateau and in the underlying epiphysis, which may represent changes of subchondral insufficiency injury.  No cortical deformity or collapse of the tibial plateau.   2.  No acute osseous abnormality of the right knee.     ASSESSMENT/PLAN:     1.  CCP and RF positive rheumatoid arthritis.  Erosion right fifth MTP joint.  - moderate severeity,   Having new onset bilateral foot drop - that is new -   D/w her that would have to see if this is rheumatoid related or other etiology   For the most part her joitn pain is controlled - s    Controlled on methotrexate 20 mg weekly, 1 mg of prednisone and 10 mg of leflunomide every morning.    - She will follow-up in rheumatology in 4 months.  , labs every 3-4 months.   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 Martines - who did her emg/ncv tusids   Other treatments to consider in the future for autoimmune etiology of foot drop - for possibly rheumatoid  vasculiti is  adding rituximab or ivig to help with this foot drop     2.  Seizure disorder, doing well on Keppra.    3.  Status post benign brain tumor removed in 2011.    4.  Significant osteoporosis.  Hold On alendronate 70 mg weekly,   She will see if she needs more dental work   Consider prolia on her next visit. Since she has diarreha with alendroante.   Due for DEXA in 4/2025 -  Wait for dental work to be done     5. Tarsal tunnel syndrome - refer to podiatry - for right foot pain     Summary  Cont. Methotrexate 8 tablets a week   Cont. Lelfunomide 10mg a day   Cont. Folic acid 1mg a day   Make appt. With dr. Martines    5. Consider sural nerve biopsy -   6. Consider rituximab or ivig as treatment options -   7. Check labs every 4 months.   8. Return to clinic in 4months.   9. Cont. Prednisone 1mg ad ay         - Hold alendroante for now - wait to see what the dentist says about more dental work  - Consider prolia in the future on next visit       calcium, and vitamin D. Increase weightbearing exercise.      Jose Peters MD  12/5/2024   3:03 PM      Total time spent caring for the patient on the day of the encounter:  40 min  This includes pre-charting, reviewing and obtaining results, exam, plan, notes, and counseling.     is applicable because the patient's medical record notes reflects the ongoing nature of the continuous longitudinal relationship of care, and the medical record indicates that there is ongoing treatment of a serious/complex medical condition.

## 2024-12-07 LAB
M TB IFN-G CD4+ T-CELLS BLD-ACNC: 0.01 IU/ML
M TB TUBERC IFN-G BLD QL: NEGATIVE
M TB TUBERC IGNF/MITOGEN IGNF CONTROL: >10 IU/ML
QFT TB1 AG MINUS NIL: 0 IU/ML
QFT TB2 AG MINUS NIL: 0.01 IU/ML

## 2025-01-09 ENCOUNTER — OFFICE VISIT (OUTPATIENT)
Dept: RHEUMATOLOGY | Facility: CLINIC | Age: 65
End: 2025-01-09

## 2025-01-09 VITALS — BODY MASS INDEX: 18 KG/M2 | HEIGHT: 64 IN

## 2025-01-09 DIAGNOSIS — M21.372 BILATERAL FOOT-DROP: ICD-10-CM

## 2025-01-09 DIAGNOSIS — Z51.81 THERAPEUTIC DRUG MONITORING: ICD-10-CM

## 2025-01-09 DIAGNOSIS — M05.79 RHEUMATOID ARTHRITIS INVOLVING MULTIPLE SITES WITH POSITIVE RHEUMATOID FACTOR (HCC): Primary | ICD-10-CM

## 2025-01-09 DIAGNOSIS — M21.371 BILATERAL FOOT-DROP: ICD-10-CM

## 2025-01-09 PROCEDURE — 99214 OFFICE O/P EST MOD 30 MIN: CPT | Performed by: INTERNAL MEDICINE

## 2025-01-09 PROCEDURE — G2211 COMPLEX E/M VISIT ADD ON: HCPCS | Performed by: INTERNAL MEDICINE

## 2025-01-09 NOTE — PROGRESS NOTES
Lisa Pearce is a 64 year old female.    HPI:     Chief Complaint   Patient presents with    Rheumatoid Arthritis         I had the pleasure of seeing Lisa Pearce on 10/19/2023 for follow up.     She  is a 62-year-old patient of Dr. Donnie Franco, with CCP and RF positive rheumatoid arthritis.  She is re-establshing since Dr. Ramirez retired.     She was last seen on 6/28/2023. She remained on 1 mg of prednisone every morning, and has continued methotrexate 20 mg weekly.  Right foot x-rays show mild osteoarthritis first MTP joint, erosion head of fifth metatarsal, old fourth metatarsal fracture with minimal deformity.  Knee x-rays nonspecific sclerosis medial left tibial plateau.  No significant arthritic change.    She was started on leflunomide 10 mg a day in 5/2023.  She is tolerating it well.    Her hands and wrists are doing well.  There is minimal stiffness and soreness across her hands in the morning.  It takes several minutes to loosen.  No joint swelling in the AM.    She takes vitamin D.  Her DEXA scan was repeated 4/10/2023.  Her T-scores were -3.0 in her left femoral neck, -2.3 in her left total hip, and -3.7 in her lumbar spine.  This is 30% worse in her hip compared to 2012, and 21% worse in her lumbar spine.  She is taking alendronate 70 mg weekly on an empty stomach without ill effects.  She takes calcium and vitamin D.  She does not get a lot of weightbearing exercise.    She doesn't have stomach upset or fatigue after taking methotrexate each week.    She denies new medical or surgical problems since I saw her.  No infections.      10/19/2023  She was off alendronate since 7/2023 - for dental procedure. She got it in 9/12/2023  She's getting her implants in December.     She went off alendroante and was noted that she had diarrhea - she is not keen on starting on it again.   She had just started in 5/2023 - .   She will see the peridenotis in December -.   She's on methotrexate 20mg a week  and prednisone 1mg ad ay.   She got labs on 10/12 /2023 with dr. Franco last week.   She also just started lisiniopril for HTN     She twised her right ankle in November 2022. She has had problmes since.   She had right foot xray in 5/2023 -   She had a right ankle xray   She has trouble with balance -   The right foot is not numb - but it feels when she walks on it all day - it starts to get numb and she has trouble walking.     3/14/2024  She's a little titght and hse's ok with the arthrits.   She is workign on her implants -   While working on that - her top dentures got infected.   3 weeks ago two teeth got pulled. And getting repairing her top dentures now.   Her bottoms are in but not iplanted.   It's very painful in her mouth.   Her arthriits is ok.   Dentists gave her pain meds    Her right foot still hurting.     7/18/2024  She is now on medicare - it's [employer] -   She is not feeling better - but today it sa good ay.   Still hasn't had her right foot looked at b/c she is getting her insurance card cmoing in -   She has a cane but she has a limp on her right foot - she thinks she broke it and it didn't medn it right   She is waiting to see podiatry with her new insurance.   Her left thumb is getting trigger finger in the last 4-6 weeks -   She started prednisoine 1mg ad ay - back again - b/c it helps more -     She is still getting dental work- she didn't have the bottom dentures in yet - so going back in 2 weeks -     12/5/2024  She's has a drop foot in her right foot and left foot - dx in 10/2024.   Mainly it was her right foot   She had her neurologist look at it.   She has b/l leg braces  Xrays were negative for fractures   She had emg/ncv studies with dr. Martines on 8/2/2024 - Conclusion: abnormal study. 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 No lumbosacral radiculopathy or myopathy.     Over 1 year she injured her right foot.   But her  balance was off this July. 2024.   She sees dr. Allen once a year fo rher seizures -   Sne's not sure when her drop foot started but likely around July.   She had emg studeis showsin axonal polyneuropathy in both feet -     The last couple of week maurice hands are bothering her.     1/9/2025  Talked dr hein - referred to 3/12/2025 - dr. Cristina raymundo - neuromuscular at [facility]   To eval for seizure disorder and polyneuropathy and to eval for the foot drop.   No biopsy -   No jointp ain . At this time regarding her RA     HISTORY:  Past Medical History:    Brain tumor (HCC)    per NG:  craniotomy    Cyclic vomiting syndrome    Osteopenia    per NG: osteopenia of spine per DEXA    Other and unspecified hyperlipidemia    Seizure disorder (HCC)    per NG:anti seizure medication; brain tumor, craniotomy      Social Hx Reviewed   Family Hx Reviewed     Medications (Active prior to today's visit):  Current Outpatient Medications   Medication Sig Dispense Refill    lisinopril 20 MG Oral Tab Take 1 tablet (20 mg total) by mouth daily.      leflunomide 10 MG Oral Tab TAKE 1 TABLET BY MOUTH DAILY 90 tablet 0    predniSONE 1 MG Oral Tab TAKE 1 TABLET BY MOUTH EVERY MORNING 90 tablet 0    methotrexate 2.5 MG Oral Tab TAKE EIGHT TABLETS BY MOUTH EVERY TUESDAY WITH DINNER 104 tablet 1    folic acid 1 MG Oral Tab Take 1 tablet (1 mg total) by mouth daily. 90 tablet 3    Calcium-Magnesium-Vitamin D (CALCIUM 500 OR) Take 1 tablet by mouth daily.      levetiracetam (KEPPRA) 750 MG Oral Tab Take 1 tablet (750 mg total) by mouth 2 (two) times daily. 180 tablet 3    Ergocalciferol (VITAMIN D OR) Take 400 mg by mouth daily.      methylPREDNISolone 4 MG Oral Tablet Therapy Pack Take as directed on package. (Patient not taking: Reported on 1/9/2025) 1 each 0    alendronate 70 MG Oral Tab Take one weekly on an empty stomach, first thing in the AM. (Patient not taking: Reported on 10/19/2023) 12 tablet 3    Multiple Vitamins-Minerals  (MULTI-VITAMIN/MINERALS) Oral Tab Take 1 tablet by mouth daily. (Patient not taking: Reported on 1/9/2025)       .cmed  Allergies:  Allergies   Allergen Reactions    Alendronic Acid OTHER (SEE COMMENTS)         ROS:   All other ROS are negative.     PHYSICAL EXAM:   HEENT: Clear oropharynx, no oral ulcers, EOM intact, clear sclear, PERRLA, pleasant, no acute distress, no CAD, no neck tendnerness, good ROM,   No rashes  CVS: RRR, no murmurs  RS: CTAB, no crackles, no rhonchi  ABD: Soft Non tender, no HSM felt, BS positive  Joint exam:   No synovitis wrists or hands.   strength is good bilaterally. Small effusions both knees.  The balls of her feet are not tender to squeeze.    Component      Latest Ref Rng 12/2/2024   WBC      4.0 - 11.0 x10(3) uL 6.1    RBC      3.80 - 5.30 x10(6)uL 3.97    Hemoglobin      12.0 - 16.0 g/dL 12.9    Hematocrit      35.0 - 48.0 % 39.7    MCV      80.0 - 100.0 fL 100.0    MCH      26.0 - 34.0 pg 32.5    MCHC      31.0 - 37.0 g/dL 32.5    RDW      11.0 - 15.0 % 13.1    RDW-SD      35.1 - 46.3 fL 47.5 (H)    Platelet Count      150.0 - 450.0 10(3)uL 257.0    CREATININE      0.55 - 1.02 mg/dL 0.73    EGFR      >=60 mL/min/1.73m2 91    AST (SGOT)      <34 U/L 22    C-REACTIVE PROTEIN      <1.00 mg/dL <0.40    SED RATE      0 - 30 mm/Hr 20    ALT (SGPT)      10 - 49 U/L 25         8/5/24  1:11 PM    Anca Screen  NEGATIVE NEGATIVE     8/5/24  1:11 PM    Rheumatoid Factor  <=14 IU/mL 27 High      8/5/24  1:11 PM    JENNIFER Screen  Negative Negative       8/5/24  1:11 PM    Erythrocyte Sedimentation Rate  0 - 20 mm/hr 14       8/5/24  1:11 PM    Jerri Interpretation SEE COMMENT   Comment:  Normal pattern. No monoclonal proteins detected.   Immunoglobulin A  70 - 320 mg/dL 360 High    Immunoglobulin G  600 - 1540 mg/dL 1,053   Immunoglobulin M  50 - 300 mg/dL 73       8/5/24  1:11 PM    Protein, Total  6.1 - 8.1 g/dL 7.5   Albumin  3.8 - 4.8 g/dL 4.5   Alpha 1 Globulin  0.2 - 0.3 g/dL 0.3   Alpha  2 Globulin  0.5 - 0.9 g/dL 0.7   Beta 1 Globulin  0.4 - 0.6 g/dL 0.5   Beta 2 Globulin  0.2 - 0.5 g/dL 0.5   Gamma Globulin  0.8 - 1.7 g/dL 1.0   Interpretation SEE NOTE   Comment: Normal Electrophoretic Pattern.   Kappa  176 - 443 mg/dL 266   Lambda  91 - 240 mg/dL 170   Kappa/Lambda Ratio  1.29 - 2.55 1.56   Comment:  This assay provides a measureme     Component      Latest Ref Rng 12/2/2024 12/5/2024   WBC      4.0 - 11.0 x10(3) uL 6.1     RBC      3.80 - 5.30 x10(6)uL 3.97     Hemoglobin      12.0 - 16.0 g/dL 12.9     Hematocrit      35.0 - 48.0 % 39.7     MCV      80.0 - 100.0 fL 100.0     MCH      26.0 - 34.0 pg 32.5     MCHC      31.0 - 37.0 g/dL 32.5     RDW      11.0 - 15.0 % 13.1     RDW-SD      35.1 - 46.3 fL 47.5 (H)     Platelet Count      150.0 - 450.0 10(3)uL 257.0     Quantiferon TB NIL      IU/mL  0.01    Quantiferon-TB1 Minus NIL      IU/mL  0.00    Quantiferon-TB2 Minus NIL      IU/mL  0.01    Quantiferon TB Mitogen minus NIL      IU/mL  >10.00    Quantiferon TB Result      Negative   Negative    CREATININE      0.55 - 1.02 mg/dL 0.73     EGFR      >=60 mL/min/1.73m2 91     HBSAg Screen      Nonreactive    Nonreactive    Hbsag Screen Index  <0.10    AST (SGOT)      <34 U/L 22     C-REACTIVE PROTEIN      <1.00 mg/dL <0.40     SED RATE      0 - 30 mm/Hr 20     ALT (SGPT)      10 - 49 U/L 25     HCV AB      Nonreactive    Nonreactive    HEPATITIS B CORE AB, TOTAL      Nonreactive    Nonreactive       Legend:  (H) High  (H) High  4/17/2023 - DEXA  LEFT FEMORAL NECK   BMD: 0.513 gm/sq. cm. T SCORE: -3.0 Z SCORE: -1.7      LEFT TOTAL HIP   BMD: 0.656 gm/sq. cm. T SCORE: -2.3 Z SCORE: -1.2      PA LUMBAR SPINE (L1 - L4)   BMD: 0.640 gm/sq. cm. T SCORE: -3.7 Z SCORE: -2.0           She had albs on 10/12/2023   Cmp - cr is 0.90, ast is 22, alt is 20,   Wbc is 5.8, hb is 14.3, plt are 317    5/17/2023 - right foot xray   Mild osteoarthritis arthritis first metatarsophalangeal joint.   2.                    Erosive change head of 5th metatarsal.   3.                             Minimal deformity of the head/neck 4th metatarsal perhaps from old trauma.   4.                             Demineralization.     5/17/2023 - b/jay nee xray   1.  Nonspecific sclerosis in the medial aspect of the left tibial plateau and in the underlying epiphysis, which may represent changes of subchondral insufficiency injury.  No cortical deformity or collapse of the tibial plateau.   2.  No acute osseous abnormality of the right knee.     ASSESSMENT/PLAN:     1.  CCP and RF positive rheumatoid arthritis.  Erosion right fifth MTP joint.  - moderate severeity,   Having new onset bilateral foot drop - that is new -   D/w her that would have to see if this is rheumatoid related or other etiology   She is seeing a neuromuscular doctor 0 dr. Verdugo - unclear if this is a separate issue from her RA - will message her to acknowledge if any extra treatment need for her RA like if this is RA vasculitis relate d- then treatments could be rituxan or IVIG   For the most part her joitn pain is controlled - s    Controlled on methotrexate 20 mg weekly, 1 mg of prednisone and 10 mg of leflunomide every morning.    - She will follow-up in rheumatology in 4 months.  , labs every 3-4 months.   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 f/u with neurology - dr. Martines - who did her emg/ncv tusids     2.  Seizure disorder, doing well on Keppra.    3.  Status post benign brain tumor removed in 2011.    4.  Significant osteoporosis.  Hold On alendronate 70 mg weekly,   She will see if she needs more dental work   Consider prolia on her next visit. Since she has diarreha with alendroante.   Due for DEXA in 4/2025 -  Wait for dental work to be done     5. Tarsal tunnel syndrome - refer to podiatry - for right foot pain     Summary  Cont.  Methotrexate 8 tablets a week   Cont. Lelfunomide 10mg a day   Cont. Folic acid 1mg a day   Will send note do dr. Eisenberg -   5. Check labs every 4 months.   6. Return to clinic in 4months.   7. Cont. Prednisone 1mg ad ay         - Hold alendroante for now - wait to see what the dentist says about more dental work  - Consider prolia in the future on next visit       calcium, and vitamin D. Increase weightbearing exercise.      Jose Peters MD  1/9/2025   4:52 PM       is applicable because the patient's medical record notes reflects the ongoing nature of the continuous longitudinal relationship of care, and the medical record indicates that there is ongoing treatment of a serious/complex medical condition.

## 2025-01-15 RX ORDER — PREDNISONE 1 MG/1
TABLET ORAL
Qty: 90 TABLET | Refills: 3 | Status: SHIPPED | OUTPATIENT
Start: 2025-01-15

## 2025-01-15 NOTE — TELEPHONE ENCOUNTER
LOV: 1/9/25  Future Appointments   Date Time Provider Department Center   5/8/2025  2:10 PM Jose Peters MD [facility] [place]     Summary  Cont. Methotrexate 8 tablets a week   Cont. Lelfunomide 10mg a day   Cont. Folic acid 1mg a day   Make appt. With dr. Martines    5. Consider sural nerve biopsy -   6. Consider rituximab or ivig as treatment options -   7. Check labs every 4 months.   8. Return to clinic in 4months.   9. Cont. Prednisone 1mg ad ay            - Hold alendroante for now - wait to see what the dentist says about more dental work  - Consider prolia in the future on next visit         calcium, and vitamin D. Increase weightbearing exercise.       Jose Peters MD  1/9/2025   4:52 PM

## 2025-02-04 DIAGNOSIS — M05.79 RHEUMATOID ARTHRITIS INVOLVING MULTIPLE SITES WITH POSITIVE RHEUMATOID FACTOR (HCC): ICD-10-CM

## 2025-02-04 NOTE — TELEPHONE ENCOUNTER
Requested Prescriptions     Pending Prescriptions Disp Refills    LEFLUNOMIDE 10 MG Oral Tab [Pharmacy Med Name: LEFLUNOMIDE 10MG TABLETS] 90 tablet 0     Sig: TAKE 1 TABLET BY MOUTH DAILY     LOV: 1/9/25  Future Appointments   Date Time Provider Department Center   5/8/2025  2:10 PM Palaniswami, Purani, MD [facility] [place]     Labs:   Component      Latest Ref Rng 12/2/2024 12/5/2024   WBC      4.0 - 11.0 x10(3) uL 6.1     RBC      3.80 - 5.30 x10(6)uL 3.97     Hemoglobin      12.0 - 16.0 g/dL 12.9     Hematocrit      35.0 - 48.0 % 39.7     MCV      80.0 - 100.0 fL 100.0     MCH      26.0 - 34.0 pg 32.5     MCHC      31.0 - 37.0 g/dL 32.5     RDW      11.0 - 15.0 % 13.1     RDW-SD      35.1 - 46.3 fL 47.5 (H)     Platelet Count      150.0 - 450.0 10(3)uL 257.0     Quantiferon TB NIL      IU/mL  0.01    Quantiferon-TB1 Minus NIL      IU/mL  0.00    Quantiferon-TB2 Minus NIL      IU/mL  0.01    Quantiferon TB Mitogen minus NIL      IU/mL  >10.00    Quantiferon TB Result      Negative   Negative    CREATININE      0.55 - 1.02 mg/dL 0.73     EGFR      >=60 mL/min/1.73m2 91     HBSAg Screen      Nonreactive    Nonreactive    Hbsag Screen Index  <0.10    AST (SGOT)      <34 U/L 22     C-REACTIVE PROTEIN      <1.00 mg/dL <0.40     SED RATE      0 - 30 mm/Hr 20     ALT (SGPT)      10 - 49 U/L 25     HCV AB      Nonreactive    Nonreactive    HEPATITIS B CORE AB, TOTAL      Nonreactive    Nonreactive       Legend:  (H) High  Summary  Cont. Methotrexate 8 tablets a week   Cont. Lelfunomide 10mg a day   Cont. Folic acid 1mg a day   Will send note do dr. Eisenberg -   5. Check labs every 4 months.   6. Return to clinic in 4months.   7. Cont. Prednisone 1mg ad ay            - Hold alendroante for now - wait to see what the dentist says about more dental work  - Consider prolia in the future on next visit         calcium, and vitamin D. Increase weightbearing exercise.       Jose Peters MD  1/9/2025   4:52 PM

## 2025-02-05 RX ORDER — LEFLUNOMIDE 10 MG/1
TABLET ORAL
Qty: 90 TABLET | Refills: 0 | Status: SHIPPED | OUTPATIENT
Start: 2025-02-05

## 2025-02-13 ENCOUNTER — LAB ENCOUNTER (OUTPATIENT)
Dept: LAB | Age: 65
End: 2025-02-13
Attending: INTERNAL MEDICINE
Payer: MEDICARE

## 2025-02-13 DIAGNOSIS — E78.5 HYPERLIPEMIA: ICD-10-CM

## 2025-02-13 DIAGNOSIS — I10 HYPERTENSION, ESSENTIAL: Primary | ICD-10-CM

## 2025-02-13 DIAGNOSIS — E03.8 SUBCLINICAL HYPOTHYROIDISM: ICD-10-CM

## 2025-02-13 LAB
ALBUMIN SERPL-MCNC: 4.7 G/DL (ref 3.2–4.8)
ALBUMIN/GLOB SERPL: 1.6 {RATIO} (ref 1–2)
ALP LIVER SERPL-CCNC: 68 U/L
ALT SERPL-CCNC: 21 U/L
ANION GAP SERPL CALC-SCNC: 7 MMOL/L (ref 0–18)
AST SERPL-CCNC: 20 U/L (ref ?–34)
BASOPHILS # BLD AUTO: 0.05 X10(3) UL (ref 0–0.2)
BASOPHILS NFR BLD AUTO: 0.7 %
BILIRUB SERPL-MCNC: 0.8 MG/DL (ref 0.2–1.1)
BILIRUB UR QL: NEGATIVE
BUN BLD-MCNC: 16 MG/DL (ref 9–23)
BUN/CREAT SERPL: 22.2 (ref 10–20)
CALCIUM BLD-MCNC: 9.6 MG/DL (ref 8.7–10.4)
CHLORIDE SERPL-SCNC: 104 MMOL/L (ref 98–112)
CHOLEST SERPL-MCNC: 292 MG/DL (ref ?–200)
CLARITY UR: CLEAR
CO2 SERPL-SCNC: 28 MMOL/L (ref 21–32)
CREAT BLD-MCNC: 0.72 MG/DL
DEPRECATED RDW RBC AUTO: 46.5 FL (ref 35.1–46.3)
EGFRCR SERPLBLD CKD-EPI 2021: 93 ML/MIN/1.73M2 (ref 60–?)
EOSINOPHIL # BLD AUTO: 0.08 X10(3) UL (ref 0–0.7)
EOSINOPHIL NFR BLD AUTO: 1.2 %
ERYTHROCYTE [DISTWIDTH] IN BLOOD BY AUTOMATED COUNT: 13.2 % (ref 11–15)
FASTING PATIENT LIPID ANSWER: NO
FASTING STATUS PATIENT QL REPORTED: NO
GLOBULIN PLAS-MCNC: 2.9 G/DL (ref 2–3.5)
GLUCOSE BLD-MCNC: 100 MG/DL (ref 70–99)
GLUCOSE UR-MCNC: NORMAL MG/DL
HCT VFR BLD AUTO: 40.8 %
HDLC SERPL-MCNC: 87 MG/DL (ref 40–59)
HGB BLD-MCNC: 13.8 G/DL
IMM GRANULOCYTES # BLD AUTO: 0.02 X10(3) UL (ref 0–1)
IMM GRANULOCYTES NFR BLD: 0.3 %
KETONES UR-MCNC: NEGATIVE MG/DL
LDLC SERPL CALC-MCNC: 189 MG/DL (ref ?–100)
LEUKOCYTE ESTERASE UR QL STRIP.AUTO: 25
LYMPHOCYTES # BLD AUTO: 0.84 X10(3) UL (ref 1–4)
LYMPHOCYTES NFR BLD AUTO: 12.1 %
MCH RBC QN AUTO: 33.1 PG (ref 26–34)
MCHC RBC AUTO-ENTMCNC: 33.8 G/DL (ref 31–37)
MCV RBC AUTO: 97.8 FL
MONOCYTES # BLD AUTO: 0.68 X10(3) UL (ref 0.1–1)
MONOCYTES NFR BLD AUTO: 9.8 %
NEUTROPHILS # BLD AUTO: 5.27 X10 (3) UL (ref 1.5–7.7)
NEUTROPHILS # BLD AUTO: 5.27 X10(3) UL (ref 1.5–7.7)
NEUTROPHILS NFR BLD AUTO: 75.9 %
NITRITE UR QL STRIP.AUTO: NEGATIVE
NONHDLC SERPL-MCNC: 205 MG/DL (ref ?–130)
OSMOLALITY SERPL CALC.SUM OF ELEC: 289 MOSM/KG (ref 275–295)
PH UR: 5.5 [PH] (ref 5–8)
PLATELET # BLD AUTO: 214 10(3)UL (ref 150–450)
POTASSIUM SERPL-SCNC: 4.4 MMOL/L (ref 3.5–5.1)
PROT SERPL-MCNC: 7.6 G/DL (ref 5.7–8.2)
PROT UR-MCNC: NEGATIVE MG/DL
RBC # BLD AUTO: 4.17 X10(6)UL
SODIUM SERPL-SCNC: 139 MMOL/L (ref 136–145)
SP GR UR STRIP: 1.01 (ref 1–1.03)
TRIGL SERPL-MCNC: 97 MG/DL (ref 30–149)
TSI SER-ACNC: 1.22 UIU/ML (ref 0.55–4.78)
UROBILINOGEN UR STRIP-ACNC: NORMAL
VLDLC SERPL CALC-MCNC: 20 MG/DL (ref 0–30)
WBC # BLD AUTO: 6.9 X10(3) UL (ref 4–11)

## 2025-02-13 PROCEDURE — 80053 COMPREHEN METABOLIC PANEL: CPT

## 2025-02-13 PROCEDURE — 85025 COMPLETE CBC W/AUTO DIFF WBC: CPT

## 2025-02-13 PROCEDURE — 81001 URINALYSIS AUTO W/SCOPE: CPT

## 2025-02-13 PROCEDURE — 36415 COLL VENOUS BLD VENIPUNCTURE: CPT

## 2025-02-13 PROCEDURE — 80061 LIPID PANEL: CPT

## 2025-02-13 PROCEDURE — 84443 ASSAY THYROID STIM HORMONE: CPT

## 2025-02-18 PROBLEM — M85.88 OSTEOPENIA OF LUMBAR SPINE: Status: ACTIVE | Noted: 2025-02-18

## 2025-02-18 PROBLEM — I10 ESSENTIAL HYPERTENSION: Status: ACTIVE | Noted: 2025-02-18

## 2025-02-18 NOTE — PROGRESS NOTES
HPI:   Lisa Pearce is a 64 year old female who presents for a complete physical exam.     Patient presents with:  Establish Care, annual physical  Patient has rheumatoid arthritis, sees rheumatologist as well as bilateral dropfoot, sees neurology who referred her to new neurologist at [facility] who does specific evaluations for this, appointment next month, patient wears bilateral ankle braces which are helpful, she states that the cause of her dropfoot is not known, she has not had imaging of her spine done            See ROS below for other pertinent positive and negative complaints    I reviewed her's Past Medical History, Past Surgical History, Family and Social History     Labs:   Lab Results   Component Value Date/Time    WBC 6.9 02/13/2025 02:04 PM    HGB 13.8 02/13/2025 02:04 PM    .0 02/13/2025 02:04 PM      Lab Results   Component Value Date/Time     (H) 02/13/2025 02:04 PM     02/13/2025 02:04 PM    K 4.4 02/13/2025 02:04 PM     02/13/2025 02:04 PM    CO2 28.0 02/13/2025 02:04 PM    CREATSERUM 0.72 02/13/2025 02:04 PM    CA 9.6 02/13/2025 02:04 PM    ALB 4.7 02/13/2025 02:04 PM    TP 7.6 02/13/2025 02:04 PM    ALKPHO 68 02/13/2025 02:04 PM    AST 20 02/13/2025 02:04 PM    ALT 21 02/13/2025 02:04 PM    BILT 0.8 02/13/2025 02:04 PM    TSH 1.221 02/13/2025 02:04 PM        Lab Results   Component Value Date/Time    CHOLEST 292 (H) 02/13/2025 02:04 PM    HDL 87 (H) 02/13/2025 02:04 PM    TRIG 97 02/13/2025 02:04 PM     (H) 02/13/2025 02:04 PM    NONHDLC 205 (H) 02/13/2025 02:04 PM         Current Outpatient Medications   Medication Sig Dispense Refill    LEFLUNOMIDE 10 MG Oral Tab TAKE 1 TABLET BY MOUTH DAILY 90 tablet 0    predniSONE 1 MG Oral Tab TAKE 1 TABLET BY MOUTH EVERY MORNING 90 tablet 3    lisinopril 20 MG Oral Tab Take 1 tablet (20 mg total) by mouth daily.      methotrexate 2.5 MG Oral Tab TAKE EIGHT TABLETS BY MOUTH EVERY TUESDAY WITH DINNER 104 tablet 1    folic  acid 1 MG Oral Tab Take 1 tablet (1 mg total) by mouth daily. 90 tablet 3    Calcium-Magnesium-Vitamin D (CALCIUM 500 OR) Take 1 tablet by mouth daily.      levetiracetam (KEPPRA) 750 MG Oral Tab Take 1 tablet (750 mg total) by mouth 2 (two) times daily. 180 tablet 3    Ergocalciferol (VITAMIN D OR) Take 400 mg by mouth daily.      methylPREDNISolone 4 MG Oral Tablet Therapy Pack Take as directed on package. (Patient not taking: Reported on 2023) 1 each 0    Multiple Vitamins-Minerals (MULTI-VITAMIN/MINERALS) Oral Tab Take 1 tablet by mouth daily. (Patient not taking: Reported on 10/19/2023)        Past Medical History:    Brain tumor (HCC)    per NG:  craniotomy    Cyclic vomiting syndrome    Osteopenia    per NG: osteopenia of spine per DEXA    Other and unspecified hyperlipidemia    Seizure disorder (HCC)    per NG:anti seizure medication; brain tumor, craniotomy      Past Surgical History:   Procedure Laterality Date    Brain surgery  2011    per NG: seizure disorder, brain tumor    Cholecystectomy      Colonoscopy      per NG:NI COLONOSCOPY      Family History   Problem Relation Age of Onset    Pulmonary Disease Father         per NG: COPD, Cause of death    Breast Cancer Mother 60    Cancer Mother         per NG: Liver, cause of death    Diabetes Sister      Allergies[1]   Social History:  Social History     Socioeconomic History    Marital status:    Tobacco Use    Smoking status: Former     Current packs/day: 0.00     Average packs/day: 0.3 packs/day for 20.0 years (5.0 ttl pk-yrs)     Types: Cigarettes     Start date: 4/3/2000     Quit date: 4/3/2020     Years since quittin.8    Smokeless tobacco: Current   Vaping Use    Vaping status: Never Used   Substance and Sexual Activity    Alcohol use: Yes     Alcohol/week: 0.0 standard drinks of alcohol     Comment: socially    Drug use: Yes     Types: Cannabis    Sexual activity: Yes     Comment: none   Other Topics Concern    Caffeine  Concern Yes     Comment: per NG: coffee; 1 cup daily           REVIEW OF SYSTEMS:   Review of Systems   Constitutional: Negative.    HENT: Negative.     Eyes: Negative.    Respiratory: Negative.     Cardiovascular: Negative.    Gastrointestinal: Negative.    Genitourinary: Negative.    Musculoskeletal:  Positive for arthralgias.   Skin: Negative.    Neurological:  Positive for weakness.   Psychiatric/Behavioral: Negative.         EXAM:   BP (!) 150/92   Pulse (!) 121   Ht 5' 4\" (1.626 m)   Wt 107 lb (48.5 kg)   BMI 18.37 kg/m²  Body mass index is 18.37 kg/m².  Physical Exam  Vitals and nursing note reviewed.   Constitutional:       General: She is awake. She is not in acute distress.     Appearance: Normal appearance. She is well-developed, well-groomed and normal weight. She is not ill-appearing or toxic-appearing.   HENT:      Head: Normocephalic and atraumatic.      Right Ear: Tympanic membrane, ear canal and external ear normal. There is no impacted cerumen.      Left Ear: Tympanic membrane, ear canal and external ear normal. There is no impacted cerumen.      Nose: Nose normal. No rhinorrhea.      Mouth/Throat:      Mouth: Mucous membranes are moist.      Pharynx: Oropharynx is clear. No oropharyngeal exudate or posterior oropharyngeal erythema.   Eyes:      General: No scleral icterus.        Right eye: No discharge.         Left eye: No discharge.      Extraocular Movements: Extraocular movements intact.      Conjunctiva/sclera: Conjunctivae normal.      Pupils: Pupils are equal, round, and reactive to light.   Neck:      Thyroid: No thyroid mass, thyromegaly or thyroid tenderness.      Vascular: Normal carotid pulses. No carotid bruit, hepatojugular reflux or JVD.      Trachea: Trachea normal.   Cardiovascular:      Rate and Rhythm: Normal rate and regular rhythm.      Pulses: Normal pulses.      Heart sounds: Normal heart sounds, S1 normal and S2 normal. No murmur heard.     No S3 or S4 sounds.    Pulmonary:      Effort: Pulmonary effort is normal. No respiratory distress.      Breath sounds: Normal breath sounds and air entry. No stridor. No wheezing, rhonchi or rales.   Chest:      Chest wall: No tenderness.   Abdominal:      General: Bowel sounds are normal. There is no distension.      Palpations: Abdomen is soft. There is no hepatomegaly, splenomegaly or mass.      Tenderness: There is no abdominal tenderness. There is no guarding or rebound.      Hernia: No hernia is present.   Musculoskeletal:         General: No swelling, tenderness or deformity.      Cervical back: Normal range of motion and neck supple. No rigidity or tenderness.      Right lower leg: No edema.      Left lower leg: No edema.   Lymphadenopathy:      Cervical: No cervical adenopathy.      Right cervical: No superficial, deep or posterior cervical adenopathy.     Left cervical: No superficial, deep or posterior cervical adenopathy.      Upper Body:      Right upper body: No supraclavicular or axillary adenopathy.      Left upper body: No supraclavicular or axillary adenopathy.      Lower Body: No right inguinal adenopathy. No left inguinal adenopathy.   Skin:     General: Skin is warm and dry.      Capillary Refill: Capillary refill takes less than 2 seconds.      Coloration: Skin is not jaundiced or pale.      Findings: No bruising, erythema or rash.      Nails: There is no clubbing.   Neurological:      Mental Status: She is alert and oriented to person, place, and time.      Cranial Nerves: Cranial nerves 2-12 are intact. No cranial nerve deficit, dysarthria or facial asymmetry.      Sensory: Sensation is intact.      Motor: Weakness (Bilateral dropfoot, wearing bilateral/lower leg braces) present. No tremor or abnormal muscle tone.      Coordination: Coordination is intact.      Deep Tendon Reflexes: Reflexes are normal and symmetric. Reflexes normal.   Psychiatric:         Attention and Perception: Attention and perception  normal.         Mood and Affect: Mood and affect normal.         Speech: Speech normal.         Behavior: Behavior normal. Behavior is cooperative.         Thought Content: Thought content normal.         Cognition and Memory: Cognition and memory normal.         Judgment: Judgment normal.       HEARING: intact B to soft voice/whisper (hearing aids: No)    ASSESSMENT AND PLAN:   1. Lisa Pearce is a 64 year old female who presents for a complete physical exam.    She is in good health overall.  Stress importance of exercise and healthy well balanced diet. Recommend low fat DASH diet and aerobic exercise 30 minutes 3-4 times weekly.    Health maintenance:   Annual B screening Mammogram: due now   Dexascan: due now  Recommend dietary calcium and Vit D  Fasting Lipids, CMP, TSH and CBC annually  Colon cancer screening: Colonoscopy (never, declined) vs Cologuard (patient aware less accurate than colonoscopy), which pt prefers at this time (ordered by previous PCP)  EKG: due  Immunizations: patient to check for coverage with insurance-P21 and Shingrix   Will follow-up pending results  The patient is asked to return for CPE in 1 year    Patient also here for:  Medical problem(s)/complaints as below    1. Routine medical exam  - EKG 12 Lead; Future    2. Immunization due    3. Screening mammogram for breast cancer  - [facility] 2D+3D SCREENING BILAT (CPT=77067/60051); Future    4. Screening for colon cancer    5. Age-related osteoporosis without current pathological fracture  - XR DEXA BONE DENSITOMETRY (CPT=77080); Future    6. Essential hypertension  See recommendations below  - EKG 12 Lead; Future    7. Dyslipidemia  Recheck now  - Lipid Panel; Future    8. Rheumatoid arthritis involving multiple sites with positive rheumatoid factor (HCC)  F/u w Rheum as routine    9. Bilateral foot-drop  Etiology unclear, pt referred to Neurology at [place], Dr. Cristina Eisenberg, has appt next month for further eval and treatment, will  discuss MRI LS then plus plan on biopsy vs?      Patient (and/or guardian or parent if less than 18 years old) was given instructions regarding diagnosis, management, expectations and follow up.    Patient Instructions   Will contact you/patient when the test(s): lipid (fast 12 hours, water OK), mammogram, DEXA bone density scan, and EKG, result(s) are final and with further recommendations then if any changes.     Recommend:    Checking your blood pressure and heart rate at home once to twice daily, vary the time that you take it, then send me (call okay) your record in 1 week for my review.  I will give you additional recommendations then.    Medication(s), as previously prescribed.    Follow-up with me pending the test results and for a blood pressure recheck in 6 months or sooner if recommended.    Follow-up with the specialist for your dropfoot next month.  Please ask her to send me a copy of her note or any test results.      Go to the emergency room or call 911 for any new or suddenly worsening symptoms, any signs of acute distress, respiratory distress or emergent changes.      Orders Placed This Encounter   Procedures    Lipid Panel     Meds & Refills for this Visit:  Requested Prescriptions      No prescriptions requested or ordered in this encounter     Other Orders, Imaging & Consults:  [facility] 2D+3D SCREENING BILAT (CPT=77067/86959)  XR DEXA BONE DENSITOMETRY (CPT=77080)  EKG 12-LEAD    No follow-ups on file.  Follow up: as above (See AVS)    All questions were answered.  We discussed the indications, proper use, risks, and benefits of the above recommendations including any medication(s) as prescribed.  The patient (and/or guardian or parent if less than 18 years old) indicate(s) understanding and agree(s) to the above plan of care.    Sheree Wood MD         [1]   Allergies  Allergen Reactions    Alendronic Acid OTHER (SEE COMMENTS)

## 2025-02-19 ENCOUNTER — OFFICE VISIT (OUTPATIENT)
Dept: FAMILY MEDICINE CLINIC | Facility: CLINIC | Age: 65
End: 2025-02-19

## 2025-02-19 VITALS
HEART RATE: 121 BPM | WEIGHT: 107 LBS | DIASTOLIC BLOOD PRESSURE: 92 MMHG | BODY MASS INDEX: 18.27 KG/M2 | HEIGHT: 64 IN | SYSTOLIC BLOOD PRESSURE: 150 MMHG

## 2025-02-19 DIAGNOSIS — Z23 IMMUNIZATION DUE: ICD-10-CM

## 2025-02-19 DIAGNOSIS — E78.5 DYSLIPIDEMIA: ICD-10-CM

## 2025-02-19 DIAGNOSIS — M21.371 BILATERAL FOOT-DROP: ICD-10-CM

## 2025-02-19 DIAGNOSIS — Z00.00 ROUTINE MEDICAL EXAM: Primary | ICD-10-CM

## 2025-02-19 DIAGNOSIS — M21.372 BILATERAL FOOT-DROP: ICD-10-CM

## 2025-02-19 DIAGNOSIS — I10 ESSENTIAL HYPERTENSION: ICD-10-CM

## 2025-02-19 DIAGNOSIS — Z12.11 SCREENING FOR COLON CANCER: ICD-10-CM

## 2025-02-19 DIAGNOSIS — M81.0 AGE-RELATED OSTEOPOROSIS WITHOUT CURRENT PATHOLOGICAL FRACTURE: ICD-10-CM

## 2025-02-19 DIAGNOSIS — Z12.31 SCREENING MAMMOGRAM FOR BREAST CANCER: ICD-10-CM

## 2025-02-19 DIAGNOSIS — M05.79 RHEUMATOID ARTHRITIS INVOLVING MULTIPLE SITES WITH POSITIVE RHEUMATOID FACTOR (HCC): ICD-10-CM

## 2025-02-19 PROCEDURE — G0402 INITIAL PREVENTIVE EXAM: HCPCS | Performed by: FAMILY MEDICINE

## 2025-02-19 PROCEDURE — 3077F SYST BP >= 140 MM HG: CPT | Performed by: FAMILY MEDICINE

## 2025-02-19 PROCEDURE — 96160 PT-FOCUSED HLTH RISK ASSMT: CPT | Performed by: FAMILY MEDICINE

## 2025-02-19 PROCEDURE — 3080F DIAST BP >= 90 MM HG: CPT | Performed by: FAMILY MEDICINE

## 2025-02-19 PROCEDURE — 3008F BODY MASS INDEX DOCD: CPT | Performed by: FAMILY MEDICINE

## 2025-02-19 NOTE — PATIENT INSTRUCTIONS
Will contact you/patient when the test(s): lipid (fast 12 hours, water OK), mammogram, DEXA bone density scan, and EKG, result(s) are final and with further recommendations then if any changes.     Recommend:    Checking your blood pressure and heart rate at home once to twice daily, vary the time that you take it, then send me (call okay) your record in 1 week for my review.  I will give you additional recommendations then.    Medication(s), as previously prescribed.    Follow-up with me pending the test results and for a blood pressure recheck in 6 months or sooner if recommended.    Follow-up with the specialist for your dropfoot next month.  Please ask her to send me a copy of her note or any test results.      Go to the emergency room or call 911 for any new or suddenly worsening symptoms, any signs of acute distress, respiratory distress or emergent changes.

## 2025-02-20 ENCOUNTER — LAB ENCOUNTER (OUTPATIENT)
Dept: LAB | Age: 65
End: 2025-02-20
Attending: FAMILY MEDICINE
Payer: MEDICARE

## 2025-02-20 ENCOUNTER — EKG ENCOUNTER (OUTPATIENT)
Dept: LAB | Age: 65
End: 2025-02-20
Attending: FAMILY MEDICINE
Payer: MEDICARE

## 2025-02-20 DIAGNOSIS — I10 ESSENTIAL HYPERTENSION: ICD-10-CM

## 2025-02-20 DIAGNOSIS — E78.5 DYSLIPIDEMIA: ICD-10-CM

## 2025-02-20 DIAGNOSIS — R94.31 ABNORMAL EKG: Primary | ICD-10-CM

## 2025-02-20 DIAGNOSIS — Z00.00 ROUTINE MEDICAL EXAM: ICD-10-CM

## 2025-02-20 LAB
ATRIAL RATE: 95 BPM
CHOLEST SERPL-MCNC: 312 MG/DL (ref ?–200)
FASTING PATIENT LIPID ANSWER: YES
HDLC SERPL-MCNC: 86 MG/DL (ref 40–59)
LDLC SERPL CALC-MCNC: 214 MG/DL (ref ?–100)
NONHDLC SERPL-MCNC: 226 MG/DL (ref ?–130)
P AXIS: 81 DEGREES
P-R INTERVAL: 160 MS
Q-T INTERVAL: 360 MS
QRS DURATION: 78 MS
QTC CALCULATION (BEZET): 452 MS
R AXIS: 16 DEGREES
T AXIS: 73 DEGREES
TRIGL SERPL-MCNC: 79 MG/DL (ref 30–149)
VENTRICULAR RATE: 95 BPM
VLDLC SERPL CALC-MCNC: 17 MG/DL (ref 0–30)

## 2025-02-20 PROCEDURE — 93010 ELECTROCARDIOGRAM REPORT: CPT | Performed by: INTERNAL MEDICINE

## 2025-02-20 PROCEDURE — 93005 ELECTROCARDIOGRAM TRACING: CPT

## 2025-02-20 PROCEDURE — 36415 COLL VENOUS BLD VENIPUNCTURE: CPT

## 2025-02-20 PROCEDURE — 80061 LIPID PANEL: CPT

## 2025-02-21 NOTE — PROGRESS NOTES
Bora Pearce,    Attached are the results of your recently performed labs/tests:    Your cholesterol is significantly elevated.    Please see the cardiologist now for additional evaluation and treatment, as referred yesterday.    Take care,     Sheree Wood MD  2/21/2025    (Report(s) are attached, future lab/test orders or any referrals are in your chart/MyChart or will be mailed to you)

## 2025-02-26 ENCOUNTER — TELEPHONE (OUTPATIENT)
Dept: FAMILY MEDICINE CLINIC | Facility: CLINIC | Age: 65
End: 2025-02-26

## 2025-02-26 RX ORDER — LISINOPRIL 5 MG/1
5 TABLET ORAL DAILY
Qty: 90 TABLET | Refills: 1 | Status: SHIPPED | OUTPATIENT
Start: 2025-02-26

## 2025-02-26 RX ORDER — LISINOPRIL 10 MG/1
10 TABLET ORAL NIGHTLY
Qty: 90 TABLET | Refills: 1 | Status: SHIPPED | OUTPATIENT
Start: 2025-02-26

## 2025-02-26 NOTE — TELEPHONE ENCOUNTER
Patient dropped off blood pressure readings:  2/19: 118/76, 92  2/20: 100/67, 81  2/21: 112/83, 110  2/22: 108/74, 107  2/23: 103/75,109  2/24: 117/89, 109  2/25: 103/76, 113  2/26: 101/72, 117    Please call patient-I reviewed her blood pressure and heart rate, recommend reducing the dose of her lisinopril, take 10 mg at bedtime and 5 mg in the morning.  I will send in new prescriptions for her to replace the 20 mg tablets.    Please ask her to send me her blood pressure record in about 2 weeks for my review.     THX!

## 2025-03-20 ENCOUNTER — HOSPITAL ENCOUNTER (OUTPATIENT)
Dept: MAMMOGRAPHY | Age: 65
Discharge: HOME | End: 2025-03-20
Attending: FAMILY MEDICINE
Payer: MEDICARE

## 2025-03-20 DIAGNOSIS — Z12.31 SCREENING MAMMOGRAM FOR BREAST CANCER: ICD-10-CM

## 2025-03-20 PROCEDURE — 77067 SCR MAMMO BI INCL CAD: CPT | Performed by: FAMILY MEDICINE

## 2025-03-20 PROCEDURE — 77063 BREAST TOMOSYNTHESIS BI: CPT | Performed by: FAMILY MEDICINE

## 2025-04-02 RX ORDER — METHOTREXATE 2.5 MG/1
TABLET ORAL
Qty: 104 TABLET | Refills: 1 | Status: SHIPPED | OUTPATIENT
Start: 2025-04-02

## 2025-04-02 NOTE — TELEPHONE ENCOUNTER
LOV: 1/9/25  Future Appointments   Date Time Provider Department Center   4/11/2025  1:40 PM [facility] [place]   7/3/2025  2:30 PM Jose Peters MD [facility]     Labs: AST 20 ALT 21 2/13/25  Summary  Cont. Methotrexate 8 tablets a week   Cont. Lelfunomide 10mg a day   Cont. Folic acid 1mg a day   Will send note do dr. Eisenberg -   5. Check labs every 4 months.   6. Return to clinic in 4months.   7. Cont. Prednisone 1mg ad ay            - Hold alendroante for now - wait to see what the dentist says about more dental work  - Consider prolia in the future on next visit         calcium, and vitamin D. Increase weightbearing exercise.       Jose Peters MD  1/9/2025   4:52 PM

## 2025-04-02 NOTE — TELEPHONE ENCOUNTER
D/w pt. That won't be here in July.   She is getting the nerve - biopsy   - 4/14/2025 - with dr. Eisenberg - asked pt. To let her know to send me the results   Will be here til the end of May - she can follow up with dr. Segura in June or later.

## 2025-04-04 ENCOUNTER — TELEPHONE (OUTPATIENT)
Dept: RHEUMATOLOGY | Facility: CLINIC | Age: 65
End: 2025-04-04

## 2025-04-04 NOTE — TELEPHONE ENCOUNTER
Patient has rescheduled a patient follow up recheck with new provider, Dr. Segura in [place] for 5/13/25   Patient was previously with Dr. Peters .    Patient is requesting the follow up labs be placed prior to the appointment for completion.   Please call the patient to confirm when orders are placed.

## 2025-04-11 ENCOUNTER — HOSPITAL ENCOUNTER (OUTPATIENT)
Dept: BONE DENSITY | Age: 65
Discharge: HOME | End: 2025-04-11
Attending: FAMILY MEDICINE
Payer: MEDICARE

## 2025-04-11 DIAGNOSIS — M81.0 AGE-RELATED OSTEOPOROSIS WITHOUT CURRENT PATHOLOGICAL FRACTURE: ICD-10-CM

## 2025-04-11 PROCEDURE — 77080 DXA BONE DENSITY AXIAL: CPT | Performed by: FAMILY MEDICINE

## 2025-05-11 DIAGNOSIS — M05.79 RHEUMATOID ARTHRITIS INVOLVING MULTIPLE SITES WITH POSITIVE RHEUMATOID FACTOR (HCC): ICD-10-CM

## 2025-05-12 RX ORDER — LEFLUNOMIDE 10 MG/1
10 TABLET ORAL DAILY
Qty: 90 TABLET | Refills: 1 | Status: SHIPPED | OUTPATIENT
Start: 2025-05-12

## 2025-05-12 NOTE — TELEPHONE ENCOUNTER
Requested Prescriptions     Pending Prescriptions Disp Refills    LEFLUNOMIDE 10 MG Oral Tab [Pharmacy Med Name: LEFLUNOMIDE 10MG TABLETS] 90 tablet 0     Sig: TAKE 1 TABLET BY MOUTH DAILY     LOV: 1/9/25  Future Appointments   Date Time Provider Department Center   5/13/2025  3:20 PM Bruna Segura DO [facility] [place]     Labs:   Component      Latest Ref Rn 12/2/2024 12/5/2024 2/13/2025   WBC      4.0 - 11.0 x10(3) uL 6.1   6.9    RBC      3.80 - 5.30 x10(6)uL 3.97   4.17    Hemoglobin      12.0 - 16.0 g/dL 12.9   13.8    Hematocrit      35.0 - 48.0 % 39.7   40.8    MCV      80.0 - 100.0 fL 100.0   97.8    MCH      26.0 - 34.0 pg 32.5   33.1    MCHC      31.0 - 37.0 g/dL 32.5   33.8    RDW-SD      35.1 - 46.3 fL 47.5 (H)   46.5 (H)    RDW      11.0 - 15.0 % 13.1   13.2    Platelet Count      150.0 - 450.0 10(3)uL 257.0   214.0    Prelim Neutrophil Abs      1.50 - 7.70 x10 (3) uL   5.27    Neutrophils Absolute      1.50 - 7.70 x10(3) uL   5.27    Lymphocytes Absolute      1.00 - 4.00 x10(3) uL   0.84 (L)    Monocytes Absolute      0.10 - 1.00 x10(3) uL   0.68    Eosinophils Absolute      0.00 - 0.70 x10(3) uL   0.08    Basophils Absolute      0.00 - 0.20 x10(3) uL   0.05    Immature Granulocyte Absolute      0.00 - 1.00 x10(3) uL   0.02    Neutrophils %      %   75.9    Lymphocytes %      %   12.1    Monocytes %      %   9.8    Eosinophils %      %   1.2    Basophils %      %   0.7    Immature Granulocyte %      %   0.3    Glucose      70 - 99 mg/dL   100 (H)    Sodium      136 - 145 mmol/L   139    Potassium      3.5 - 5.1 mmol/L   4.4    Chloride      98 - 112 mmol/L   104    Carbon Dioxide, Total      21.0 - 32.0 mmol/L   28.0    ANION GAP      0 - 18 mmol/L   7    BUN      9 - 23 mg/dL   16    CREATININE      0.55 - 1.02 mg/dL 0.73   0.72    BUN/CREATININE RATIO      10.0 - 20.0    22.2 (H)    CALCIUM      8.7 - 10.4 mg/dL   9.6    CALCULATED OSMOLALITY      275 - 295 mOsm/kg   289    EGFR      >=60  mL/min/1.73m2 91   93    ALT (SGPT)      10 - 49 U/L 25   21    AST (SGOT)      <34 U/L 22   20    ALKALINE PHOSPHATASE      50 - 130 U/L   68    Total Bilirubin      0.2 - 1.1 mg/dL   0.8    PROTEIN, TOTAL      5.7 - 8.2 g/dL   7.6    Albumin      3.2 - 4.8 g/dL   4.7    Globulin      2.0 - 3.5 g/dL   2.9    A/G Ratio      1.0 - 2.0    1.6    Patient Fasting for CMP?   No    Quantiferon TB NIL      IU/mL  0.01     Quantiferon-TB1 Minus NIL      IU/mL  0.00     Quantiferon-TB2 Minus NIL      IU/mL  0.01     Quantiferon TB Mitogen minus NIL      IU/mL  >10.00     Quantiferon TB Result      Negative   Negative     HBSAg Screen      Nonreactive    Nonreactive     Hbsag Screen Index  <0.10     C-REACTIVE PROTEIN      <1.00 mg/dL <0.40      SED RATE      0 - 30 mm/Hr 20      HCV AB      Nonreactive    Nonreactive     HEPATITIS B CORE AB, TOTAL      Nonreactive    Nonreactive        Legend:  (H) High  (L) Low    Summary  Cont. Methotrexate 8 tablets a week   Cont. Lelfunomide 10mg a day   Cont. Folic acid 1mg a day   Will send note do dr. Eisenberg -   5. Check labs every 4 months.   6. Return to clinic in 4months.   7. Cont. Prednisone 1mg ad ay            - Hold alendroante for now - wait to see what the dentist says about more dental work  - Consider prolia in the future on next visit         calcium, and vitamin D. Increase weightbearing exercise.       Jose Peters MD  1/9/2025   4:52 PM

## 2025-05-13 ENCOUNTER — LAB ENCOUNTER (OUTPATIENT)
Dept: LAB | Age: 65
End: 2025-05-13
Attending: INTERNAL MEDICINE
Payer: MEDICARE

## 2025-05-13 ENCOUNTER — OFFICE VISIT (OUTPATIENT)
Dept: RHEUMATOLOGY | Facility: CLINIC | Age: 65
End: 2025-05-13

## 2025-05-13 ENCOUNTER — TELEPHONE (OUTPATIENT)
Age: 65
End: 2025-05-13

## 2025-05-13 VITALS
BODY MASS INDEX: 18 KG/M2 | HEIGHT: 64 IN | SYSTOLIC BLOOD PRESSURE: 140 MMHG | HEART RATE: 104 BPM | DIASTOLIC BLOOD PRESSURE: 80 MMHG

## 2025-05-13 DIAGNOSIS — M21.371 BILATERAL FOOT-DROP: ICD-10-CM

## 2025-05-13 DIAGNOSIS — G62.9 NEUROPATHY: ICD-10-CM

## 2025-05-13 DIAGNOSIS — Z79.899 ENCOUNTER FOR LONG-TERM (CURRENT) USE OF HIGH-RISK MEDICATION: ICD-10-CM

## 2025-05-13 DIAGNOSIS — M06.4 INFLAMMATORY POLYARTHRITIS (HCC): ICD-10-CM

## 2025-05-13 DIAGNOSIS — M05.79 RHEUMATOID ARTHRITIS INVOLVING MULTIPLE SITES WITH POSITIVE RHEUMATOID FACTOR (HCC): ICD-10-CM

## 2025-05-13 DIAGNOSIS — M21.372 BILATERAL FOOT-DROP: ICD-10-CM

## 2025-05-13 DIAGNOSIS — M81.0 AGE-RELATED OSTEOPOROSIS WITHOUT CURRENT PATHOLOGICAL FRACTURE: ICD-10-CM

## 2025-05-13 DIAGNOSIS — M05.79 RHEUMATOID ARTHRITIS INVOLVING MULTIPLE SITES WITH POSITIVE RHEUMATOID FACTOR (HCC): Primary | ICD-10-CM

## 2025-05-13 LAB
ALT SERPL-CCNC: 21 U/L (ref 10–49)
AST SERPL-CCNC: 24 U/L (ref ?–34)
BASOPHILS # BLD AUTO: 0.06 X10(3) UL (ref 0–0.2)
BASOPHILS NFR BLD AUTO: 1.1 %
CALCIUM BLD-MCNC: 10.1 MG/DL (ref 8.7–10.4)
CREAT BLD-MCNC: 0.78 MG/DL (ref 0.55–1.02)
CRP SERPL-MCNC: <0.4 MG/DL (ref ?–1)
DEPRECATED RDW RBC AUTO: 54.9 FL (ref 35.1–46.3)
EGFRCR SERPLBLD CKD-EPI 2021: 84 ML/MIN/1.73M2 (ref 60–?)
EOSINOPHIL # BLD AUTO: 0.06 X10(3) UL (ref 0–0.7)
EOSINOPHIL NFR BLD AUTO: 1.1 %
ERYTHROCYTE [DISTWIDTH] IN BLOOD BY AUTOMATED COUNT: 15.1 % (ref 11–15)
ERYTHROCYTE [SEDIMENTATION RATE] IN BLOOD: 29 MM/HR (ref 0–30)
HCT VFR BLD AUTO: 41.5 % (ref 35–48)
HGB BLD-MCNC: 13.5 G/DL (ref 12–16)
IMM GRANULOCYTES # BLD AUTO: 0.01 X10(3) UL (ref 0–1)
IMM GRANULOCYTES NFR BLD: 0.2 %
LYMPHOCYTES # BLD AUTO: 1.79 X10(3) UL (ref 1–4)
LYMPHOCYTES NFR BLD AUTO: 33.3 %
MCH RBC QN AUTO: 32.5 PG (ref 26–34)
MCHC RBC AUTO-ENTMCNC: 32.5 G/DL (ref 31–37)
MCV RBC AUTO: 99.8 FL (ref 80–100)
MONOCYTES # BLD AUTO: 0.54 X10(3) UL (ref 0.1–1)
MONOCYTES NFR BLD AUTO: 10.1 %
NEUTROPHILS # BLD AUTO: 2.91 X10 (3) UL (ref 1.5–7.7)
NEUTROPHILS # BLD AUTO: 2.91 X10(3) UL (ref 1.5–7.7)
NEUTROPHILS NFR BLD AUTO: 54.2 %
PLATELET # BLD AUTO: 278 10(3)UL (ref 150–450)
RBC # BLD AUTO: 4.16 X10(6)UL (ref 3.8–5.3)
VIT D+METAB SERPL-MCNC: 47.1 NG/ML (ref 30–100)
WBC # BLD AUTO: 5.4 X10(3) UL (ref 4–11)

## 2025-05-13 PROCEDURE — 84450 TRANSFERASE (AST) (SGOT): CPT

## 2025-05-13 PROCEDURE — 3079F DIAST BP 80-89 MM HG: CPT | Performed by: INTERNAL MEDICINE

## 2025-05-13 PROCEDURE — 82306 VITAMIN D 25 HYDROXY: CPT

## 2025-05-13 PROCEDURE — G2211 COMPLEX E/M VISIT ADD ON: HCPCS | Performed by: INTERNAL MEDICINE

## 2025-05-13 PROCEDURE — 84460 ALANINE AMINO (ALT) (SGPT): CPT

## 2025-05-13 PROCEDURE — 3077F SYST BP >= 140 MM HG: CPT | Performed by: INTERNAL MEDICINE

## 2025-05-13 PROCEDURE — 85025 COMPLETE CBC W/AUTO DIFF WBC: CPT

## 2025-05-13 PROCEDURE — 86140 C-REACTIVE PROTEIN: CPT

## 2025-05-13 PROCEDURE — 82565 ASSAY OF CREATININE: CPT

## 2025-05-13 PROCEDURE — 36415 COLL VENOUS BLD VENIPUNCTURE: CPT

## 2025-05-13 PROCEDURE — 82310 ASSAY OF CALCIUM: CPT

## 2025-05-13 PROCEDURE — 85652 RBC SED RATE AUTOMATED: CPT

## 2025-05-13 PROCEDURE — 99215 OFFICE O/P EST HI 40 MIN: CPT | Performed by: INTERNAL MEDICINE

## 2025-05-13 RX ORDER — ROSUVASTATIN CALCIUM 10 MG/1
10 TABLET, COATED ORAL NIGHTLY
COMMUNITY
Start: 2025-03-03

## 2025-05-13 NOTE — PROGRESS NOTES
RHEUMATOLOGY CLINIC- FOLLOW-UP/TRANSFER OF CARE    Lisa Pearce is a 64 year old female.    ASSESSMENT/PLAN:       ICD-10-CM    1. Rheumatoid arthritis involving multiple sites with positive rheumatoid factor (HCC)  M05.79 ALT(SGPT)     AST (SGOT)     Creatinine, Serum     CBC With Differential With Platelet     C-Reactive Protein     Sed Rate, Westergren (Automated)     Calcium     Vitamin D      2. Encounter for long-term (current) use of high-risk medication  Z79.899 ALT(SGPT)     AST (SGOT)     Creatinine, Serum     CBC With Differential With Platelet     C-Reactive Protein     Sed Rate, Westergren (Automated)     Calcium     Vitamin D      3. Age-related osteoporosis without current pathological fracture  M81.0 ALT(SGPT)     AST (SGOT)     Creatinine, Serum     CBC With Differential With Platelet     C-Reactive Protein     Sed Rate, Westergren (Automated)     Calcium     Vitamin D      4. Neuropathy  G62.9 ALT(SGPT)     AST (SGOT)     Creatinine, Serum     CBC With Differential With Platelet     C-Reactive Protein     Sed Rate, Westergren (Automated)     Calcium     Vitamin D      5. Bilateral foot-drop  M21.371 ALT(SGPT)    M21.372 AST (SGOT)     Creatinine, Serum     CBC With Differential With Platelet     C-Reactive Protein     Sed Rate, Westergren (Automated)     Calcium     Vitamin D      6. Inflammatory polyarthritis (HCC)  M06.4 ALT(SGPT)     AST (SGOT)     Creatinine, Serum     CBC With Differential With Platelet     C-Reactive Protein     Sed Rate, Westergren (Automated)     Calcium     Vitamin D          DISCUSSION:  Presents for transfer of care, formally following with Dr. Peters, for seropositive RA overall doing well on leflunomide/methotrexate as well as chronic prednisone.  No synovitis on current exam.  Given her osteoporosis though, discussed with patient trying to wean prednisone to as needed dosing.  Most recent DEXA 4/11/2025.  Compared to prior DEXA in 2023, slight worsening along  the left hip but overall similar in the left femoral neck/lumbar spine.  Appears she is advised by her dentist to stop the alendronate but she is unsure why.  Denies prior history of jaw osteonecrosis.  We discussed risk/benefit of Prolia to which she would be agreeable.  Will look into PA.  Patient sent office call today and my cell phone information was given.  Will refill medications pending updated lab work.  Additionally, patient following closely with [place] regarding her foot drop.  It appears she has significant degenerative disc disease and was recommended referral to a spinal specialist.  Prior ANCA negative.    PLAN:  -Pending lab work, would continue current RA regimen including: Leflunomide 10 mg daily and methotrexate 2.5 mg tablets x 8 tablets once weekly with folic acid 1 mg daily.  - Discussed with patient to decrease prednisone to as needed dosing only: PRN Prednisone 1 mg every day  - Will look into PA for Prolia.  Message also sent to dental office today.  - Consult/evaluation communicated with referring physician/provider.         -Dentist: Dr. Sonia Kilgore.  Message left with office today.    Will notify patient regarding PA for Prolia and regarding first dose.  Otherwise, we will continue close lab monitoring.  Patient to follow-up in months afterwards.    Bruna Segura DO  5/13/2025   4:16 PM    There is a longitudinal care relationship with me, the care plan reflects the ongoing nature of the continuous relationship of care, and the medical record indicates that there is ongoing treatment of a serious/complex medical condition which I am currently managing.  is Applicable.         Discussed with patient that they are on chronic treatment with a high-risk medication that requires close lab monitoring for possible drug toxicity.  Additionally, discussed with patient give the possible immunosuppressive effects of their medication as well as their underlying hyper-inflammatory state, the  importance of keeping vaccinations and age-appropriate screenings up-to-date, given increased risk of complications and malignancies seen in these patient populations.  Patient to f/u with repeat labs drawn prior (standing labs ordered).  Last QuantiFERON TB testin24   Last Hepatitis testin24      HPI:     25 Follow-Up/Transfer of Care: I had the pleasure of seeing Lisa Pearce on 2025 for h/o RA, formerly following with Dr. Peters.    64 year old female w/ PMH RA, foot drop, HTN, L frontal menigioma s/p craniotomy who presents to clinic today.Of note, patient formally following with Dr. Peters with last appointment 2025 for double seropositive RA on methotrexate, leflunomide, and chronic prednisone.  Also, has a history of osteoporosis.  Was on alendronate 70 mg weekly with calcium and vitamin D supplementation.  Noted issues with alendronate and diarrhea and being on/off for dental procedures in the past.  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  No lumbosacral radiculopathy or myopathy.  Has a history of seizures.  At her most recent appointment 2025, patient being evaluated at [facility] for seizure disorder and polyneuropathy in the setting of foot drop.  No joint pain during that appointment.    During the current appointment, patient states that she is feeling overall well arthralgia wide on the current regimen of leflunomide, methotrexate, prednisone.  Unsure whether prednisone has been weaned in the past and whether she flared.  States was ultimately told to remain off alendronate by her dentist but she is unsure why.  No upcoming dental procedures planned.  Of note, patient is following with a neuromuscular specialist at [facility] for her foot drop/neuropathy: MRI lumbar spine with significant degenerative disc disease.  They are recommending evaluation by spinal  surgery.      Current Medications:  Leflunomide 10 mg daily  Methotrexate 2.5 mg tablets x 8 tablets once weekly with folic acid 1 mg daily  Prednisone 1 mg daily    Medication History:  Alendronate- advised by dentist to remain off medication. Unsure why.     Interval History:     1/9/25 Dr. Peters Note;   I had the pleasure of seeing Lisa Pearce on 10/19/2023 for follow up.      She  is a 62-year-old patient of Dr. Donnie Franco, with CCP and RF positive rheumatoid arthritis.  She is re-establshing since Dr. Ramirez retired.      She was last seen on 6/28/2023. She remained on 1 mg of prednisone every morning, and has continued methotrexate 20 mg weekly.  Right foot x-rays show mild osteoarthritis first MTP joint, erosion head of fifth metatarsal, old fourth metatarsal fracture with minimal deformity.  Knee x-rays nonspecific sclerosis medial left tibial plateau.  No significant arthritic change.     She was started on leflunomide 10 mg a day in 5/2023.  She is tolerating it well.     Her hands and wrists are doing well.  There is minimal stiffness and soreness across her hands in the morning.  It takes several minutes to loosen.  No joint swelling in the AM.     She takes vitamin D.  Her DEXA scan was repeated 4/10/2023.  Her T-scores were -3.0 in her left femoral neck, -2.3 in her left total hip, and -3.7 in her lumbar spine.  This is 30% worse in her hip compared to 2012, and 21% worse in her lumbar spine.  She is taking alendronate 70 mg weekly on an empty stomach without ill effects.  She takes calcium and vitamin D.  She does not get a lot of weightbearing exercise.     She doesn't have stomach upset or fatigue after taking methotrexate each week.     She denies new medical or surgical problems since I saw her.  No infections.        10/19/2023  She was off alendronate since 7/2023 - for dental procedure. She got it in 9/12/2023  She's getting her implants in December.      She went off alendroante  and was noted that she had diarrhea - she is not keen on starting on it again.   She had just started in 5/2023 - .   She will see the peridenotis in December -.   She's on methotrexate 20mg a week and prednisone 1mg ad ay.   She got labs on 10/12 /2023 with dr. Franco last week.   She also just started lisiniopril for HTN      She twised her right ankle in November 2022. She has had problmes since.   She had right foot xray in 5/2023 -   She had a right ankle xray   She has trouble with balance -   The right foot is not numb - but it feels when she walks on it all day - it starts to get numb and she has trouble walking.      3/14/2024  She's a little titght and hse's ok with the arthrits.   She is workign on her implants -   While working on that - her top dentures got infected.   3 weeks ago two teeth got pulled. And getting repairing her top dentures now.   Her bottoms are in but not iplanted.   It's very painful in her mouth.   Her arthriits is ok.   Dentists gave her pain meds     Her right foot still hurting.      7/18/2024  She is now on medicare - it's [employer] -   She is not feeling better - but today it sa good ay.   Still hasn't had her right foot looked at b/c she is getting her insurance card cmoing in -   She has a cane but she has a limp on her right foot - she thinks she broke it and it didn't medn it right   She is waiting to see podiatry with her new insurance.   Her left thumb is getting trigger finger in the last 4-6 weeks -   She started prednisoine 1mg ad ay - back again - b/c it helps more -      She is still getting dental work- she didn't have the bottom dentures in yet - so going back in 2 weeks -      12/5/2024  She's has a drop foot in her right foot and left foot - dx in 10/2024.   Mainly it was her right foot   She had her neurologist look at it.   She has b/l leg braces  Xrays were negative for fractures   She had emg/ncv studies with dr. Martines on 8/2/2024 - Conclusion: abnormal  study. 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 No lumbosacral radiculopathy or myopathy.      Over 1 year she injured her right foot.   But her balance was off this July. 2024.   She sees dr. Allen once a year fo rher seizures -   Sne's not sure when her drop foot started but likely around July.   She had emg studeis showsin axonal polyneuropathy in both feet -      The last couple of week maurice hands are bothering her.      1/9/2025  Talked dr hein - referred to 3/12/2025 - dr. Cristina raymundo - [facility]uscular [facility]   To eval for seizure disorder and polyneuropathy and to eval for the foot drop.   No biopsy -   No jointp ain . At this time regarding her RA     HISTORY:  Past Medical History[1]   Social Hx Reviewed   Family Hx Reviewed     Medications (Active prior to today's visit):  Current Medications[2]    Allergies:  Allergies[3]      ROS:   Review of Systems   Constitutional:  Negative for chills and fever.   HENT:  Negative for congestion, hearing loss, mouth sores, nosebleeds and trouble swallowing.    Eyes:  Negative for photophobia, pain, redness and visual disturbance.   Respiratory:  Negative for cough and shortness of breath.    Cardiovascular:  Negative for chest pain, palpitations and leg swelling.   Gastrointestinal:  Negative for abdominal pain, blood in stool, diarrhea and nausea.   Endocrine: Negative for cold intolerance and heat intolerance.   Genitourinary:  Negative for dysuria, frequency and hematuria.   Musculoskeletal:  Positive for back pain and gait problem. Negative for arthralgias, joint swelling, myalgias, neck pain and neck stiffness.   Skin:  Negative for color change and rash.   Neurological:  Positive for weakness (foot drop). Negative for dizziness, numbness and headaches.   Psychiatric/Behavioral:  Negative for confusion and dysphoric mood.         PHYSICAL EXAM:     Constitutional:  Well developed, Well nourished,  No acute distress  HENT:  Normocephalic, Atraumatic, Bilateral external ears normal, Oropharynx moist, No oral exudates.  Neck: Normal range of motion, No tenderness, Supple, No stridor.  Eyes:  PERRL, EOMI, Conjunctiva normal, No discharge.  Respiratory:  Normal breath sounds, No respiratory distress, No wheezing.  Cardiovascular:  Normal heart rate, Normal rhythm, No murmurs, No rubs, No gallops.  GI:  Bowel sounds normal, Soft, No tenderness, No masses, No pulsatile masses.  : No CVA tenderness.  Musculoskeletal:  A comprehensive 28 count joint exam was done and was negative for swelling or tenderness except as noted. Inspections for misalignment, asymmetry, crepitation, defects, tenderness, masses, nodules, effusions, range of motion, and stability in the upper and lower extremities bilaterally are all normal unless noted.           Integument:  Warm, Dry, No erythema, No rash.  Lymphatic:  No lymphadenopathy noted.  Neurologic:  Alert & oriented x 3, , No focal deficits noted.  Psychiatric:  Affect normal, Judgment normal, Mood normal.    LABS:     Prior lab work reviewed and notable for:     2025:  TSH 1.221 WNL  CBC with normal WBC, Hg, PLT  CMP with BUN 16, CR 0.72 WNL, LFTs not elevated, no gamma gap. Ca 9.6 WNL    2024  QuantiFERON-TB testing negative, hepatitis B surface antigen, hepatitis B core antibody nonreactive, hepatitis C antibody nonreactive  ESR 20 WNL, CRP less than 0.4 WNL    2024:  ANCA screen negative  RF 27 (high)  JENNIFER direct screen negative    Imagin/11/25 DEXA:       FINDINGS:     LEFT FEMORAL NECK  BMD: 0.525 gm/sq. cm. T SCORE: -2.9 Z SCORE: -1.4     Change:  Bone mineral density has increased by 2.3% compared to 04/10/2023.     LEFT TOTAL HIP  BMD: 0.625 gm/sq. cm. T SCORE: -2.6 Z SCORE: -1.3     Change:  Bone mineral density has decreased by 4.7% compared to 04/10/2023.     PA LUMBAR SPINE (L1 - L4)  BMD: 0.649 gm/sq. cm. T SCORE: -3.6 Z SCORE: -1.8     Change:   Bone mineral density has increased by 1.3% compared to 04/10/2023.        T scores are a comparison to sex-matched patients with mean peak bone mass and are given in standard deviation (s.d.).  Each 1 s.d. corresponds to approximately 10% below peak normal bone density.       WORLD HEALTH ORGANIZATION CRITERIA  NORMAL T SCORE: Above -1 s.d.    OSTEOPENIA T SCORE: Between -1 and -2.5 s.d.    OSTEOPOROSIS T SCORE: -2.5 s.d.     National Osteoporosis Foundation Clinician's Guide to Prevention and Treatment of Osteoporosis recommendations for treatment:  Post menopausal women and men age 50 and older presenting with the following should be considered for treatment:  * A hip or vertebral (clinical or morphometric) fracture  * T score < -2.5 at the femoral neck or spine after appropriate evaluation to exclude secondary causes.  * Low bone mass (T score between -1.0 and -2.5 at the femoral neck or spine) and a 10-year probability of a hip fracture > 3% or a 10-year probability of a major osteoporosis-related fracture > 20% based on the [place]-adapted WHO algorithm               Impression   CONCLUSION:  1. Osteoporosis, which according to World Health Organization criteria places the patient at a severely increased risk for fracture.     2. Based on left femoral neck bone mineral density, the FRAX 10 year probability of a major osteoporotic fracture is 26% and the 10 year probability of a hip fracture is 9.2%.     3. Compared to the prior study, bone mineral density has increased in the lumbar spine and left femoral neck, as well as decreased in the left total hip.     8/23/2024 bilateral foot XR:  R Findings and impression:      Normal alignment.  Mild DJD 1st MTP joint.  No bone erosions.  No acute or healing fracture.     L Findings and impression:  Normal alignment.  No acute or healing fracture.  No erosions.  Mild narrowing 1st MTP joint     5/17/23 Bilateral Knee XR:   Impression   CONCLUSION:  1.  Nonspecific  sclerosis in the medial aspect of the left tibial plateau and in the underlying epiphysis, which may represent changes of subchondral insufficiency injury.  No cortical deformity or collapse of the tibial plateau.  2.  No acute osseous abnormality of the right knee.          [1]   Past Medical History:   Brain tumor (HCC)    per NG:  craniotomy    Cyclic vomiting syndrome    Osteopenia    per NG: osteopenia of spine per DEXA    Other and unspecified hyperlipidemia    Seizure disorder (HCC)    per NG:anti seizure medication; brain tumor, craniotomy   [2]   Current Outpatient Medications   Medication Sig Dispense Refill    leflunomide 10 MG Oral Tab Take 1 tablet (10 mg total) by mouth daily. 90 tablet 1    METHOTREXATE 2.5 MG Oral Tab TAKE EIGHT TABLETS BY MOUTH EVERY TUESDAY WITH DINNER 104 tablet 1    lisinopril 10 MG Oral Tab Take 1 tablet (10 mg total) by mouth at bedtime. 90 tablet 1    lisinopril 5 MG Oral Tab Take 1 tablet (5 mg total) by mouth daily. In the morning 90 tablet 1    predniSONE 1 MG Oral Tab TAKE 1 TABLET BY MOUTH EVERY MORNING 90 tablet 3    folic acid 1 MG Oral Tab Take 1 tablet (1 mg total) by mouth daily. 90 tablet 3    Calcium-Magnesium-Vitamin D (CALCIUM 500 OR) Take 1 tablet by mouth daily.      methylPREDNISolone 4 MG Oral Tablet Therapy Pack Take as directed on package. (Patient not taking: Reported on 6/28/2023) 1 each 0    levetiracetam (KEPPRA) 750 MG Oral Tab Take 1 tablet (750 mg total) by mouth 2 (two) times daily. 180 tablet 3    Ergocalciferol (VITAMIN D OR) Take 400 mg by mouth daily.      Multiple Vitamins-Minerals (MULTI-VITAMIN/MINERALS) Oral Tab Take 1 tablet by mouth daily. (Patient not taking: Reported on 10/19/2023)     [3]   Allergies  Allergen Reactions    Alendronic Acid OTHER (SEE COMMENTS)

## 2025-05-13 NOTE — PATIENT INSTRUCTIONS
You were seen today for follow-up of rheumatoid arthritis and osteoporosis.    Lets repeat your labs today to make sure that you are safe to proceed with methotrexate and leflunomide.  Please try to minimize your prednisone use especially given your osteoporosis to only as needed dosing.    We discussed the risk and benefits of modifying your osteoporosis treatment to Prolia.  Let me check in with your insurance.  Will need to check calcium and vitamin D levels prior to each Prolia injection which happens every 6 months.  We will let you know what your insurance says.    Otherwise, please repeat labs in 3 months and follow-up with me in about 6 months.  Can try to coordinate the Prolia injections with follow-ups with me.  Thank you.

## 2025-05-14 DIAGNOSIS — M05.79 RHEUMATOID ARTHRITIS INVOLVING MULTIPLE SITES WITH POSITIVE RHEUMATOID FACTOR (HCC): ICD-10-CM

## 2025-05-14 RX ORDER — LEFLUNOMIDE 10 MG/1
10 TABLET ORAL DAILY
Qty: 90 TABLET | Refills: 3 | Status: SHIPPED | OUTPATIENT
Start: 2025-05-14

## 2025-05-15 ENCOUNTER — TELEPHONE (OUTPATIENT)
Age: 65
End: 2025-05-15

## 2025-05-15 NOTE — TELEPHONE ENCOUNTER
Called patient verified patient name and . Informed patient of Dr. Segura's comments on her recent blood tests \"Blood counts similar from before. Kidney and liver testing normal. Inflammation markers normal. Also, checked calcium and vitamin D in anticipation of starting osteoporosis treatment and these tests are normal.\" Patient verbalized understanding    Informed patient we are working on the PA for the Prolia injection and will reach out to her to set up a nurse visit for the injection once approved by insurance.

## 2025-05-29 ENCOUNTER — TELEPHONE (OUTPATIENT)
Age: 65
End: 2025-05-29

## 2025-07-14 RX ORDER — FOLIC ACID 1 MG/1
1 TABLET ORAL DAILY
Qty: 90 TABLET | Refills: 3 | Status: SHIPPED | OUTPATIENT
Start: 2025-07-14

## 2025-07-14 NOTE — TELEPHONE ENCOUNTER
LOV: 5/13/25  Future Appointments   Date Time Provider Department Center   11/18/2025  2:40 PM Bruna Seg[facility]       RHEUMATOLOGY CLINIC- FOLLOW-UP/TRANSFER OF CARE     Lisa Pearce is a 64 year old female.     ASSESSMENT/PLAN:          ICD-10-CM     1. Rheumatoid arthritis involving multiple sites with positive rheumatoid factor (HCC)  M05.79 ALT(SGPT)       AST (SGOT)       Creatinine, Serum       CBC With Differential With Platelet       C-Reactive Protein       Sed Rate, Westergren (Automated)       Calcium       Vitamin D       2. Encounter for long-term (current) use of high-risk medication  Z79.899 ALT(SGPT)       AST (SGOT)       Creatinine, Serum       CBC With Differential With Platelet       C-Reactive Protein       Sed Rate, Westergren (Automated)       Calcium       Vitamin D       3. Age-related osteoporosis without current pathological fracture  M81.0 ALT(SGPT)       AST (SGOT)       Creatinine, Serum       CBC With Differential With Platelet       C-Reactive Protein       Sed Rate, Westergren (Automated)       Calcium       Vitamin D       4. Neuropathy  G62.9 ALT(SGPT)       AST (SGOT)       Creatinine, Serum       CBC With Differential With Platelet       C-Reactive Protein       Sed Rate, Westergren (Automated)       Calcium       Vitamin D       5. Bilateral foot-drop  M21.371 ALT(SGPT)     M21.372 AST (SGOT)       Creatinine, Serum       CBC With Differential With Platelet       C-Reactive Protein       Sed Rate, Westergren (Automated)       Calcium       Vitamin D       6. Inflammatory polyarthritis (HCC)  M06.4 ALT(SGPT)       AST (SGOT)       Creatinine, Serum       CBC With Differential With Platelet       C-Reactive Protein       Sed Rate, Westergren (Automated)       Calcium       Vitamin D             DISCUSSION:  Presents for transfer of care, formally following with Dr. Peters, for seropositive RA overall doing well on leflunomide/methotrexate as well as  chronic prednisone.  No synovitis on current exam.  Given her osteoporosis though, discussed with patient trying to wean prednisone to as needed dosing.  Most recent DEXA 4/11/2025.  Compared to prior DEXA in 2023, slight worsening along the left hip but overall similar in the left femoral neck/lumbar spine.  Appears she is advised by her dentist to stop the alendronate but she is unsure why.  Denies prior history of jaw osteonecrosis.  We discussed risk/benefit of Prolia to which she would be agreeable.  Will look into PA.  Patient sent office call today and my cell phone information was given.  Will refill medications pending updated lab work.  Additionally, patient following closely with [place] regarding her foot drop.  It appears she has significant degenerative disc disease and was recommended referral to a spinal specialist.  Prior ANCA negative.     PLAN:  -Pending lab work, would continue current RA regimen including: Leflunomide 10 mg daily and methotrexate 2.5 mg tablets x 8 tablets once weekly with folic acid 1 mg daily.  - Discussed with patient to decrease prednisone to as needed dosing only: PRN Prednisone 1 mg every day  - Will look into PA for Prolia.  Message also sent to dental office today.  - Consult/evaluation communicated with referring physician/provider.         -Dentist: Dr. Sonia Kilgore.  Message left with office today.     Will notify patient regarding PA for Prolia and regarding first dose.  Otherwise, we will continue close lab monitoring.  Patient to follow-up in months afterwards.     Bruna Segura DO  5/13/2025   4:16 PM

## (undated) NOTE — LETTER
7/26/2019              [place] [address]         Dear Lois Strong,    It was a pleasure to see you. Mammogram is normal, repeat in 1 year. There is no need for further testing at this time.   I look forward to seei

## (undated) NOTE — LETTER
5/31/2017              [address]         Dear Chris Guillen,      It was a pleasure to see you at our [address] [facility] office.   Your Mammogram is normal. There is no

## (undated) NOTE — MR AVS SNAPSHOT
[facility]  [address] [facility], [address] [facility]   [phone]               Thank you for choosing us for your health care visit with Kd Kennedy MD.  We are glad to serve you and happy to provide you with this summary of information, go to [email or web address] and click on the Sign Up Now link in the [employer] box. Enter your [employer] Activation Code exactly as it appears below along with your Zip Code and Date of Birth to complete the sign-up process.  If you do

## (undated) NOTE — LETTER
7/3/2018              [address]aren [address]         Dear Yessica Leyva,      It was a pleasure to see you at our [address] [facility] office.   Your mammogram is normal, repeat pap i

## (undated) NOTE — MR AVS SNAPSHOT
After Visit Summary   9/14/2021    Brian Razo   MRN: [id number]           Visit Information     Date & Time  9/14/2021  3:00 PM Provider  Claudette Ramirez MD [address] [place] [address], [address] [place] [address] Dept.  Phone  [phone]      Your ([id number])    Instructions:  To schedule a test at any [facility], call Central Scheduling at [phone], Monday through Friday between 7:30am to 6pm and on Saturday between 8am and 1pm. Evening and weekend appointments will be your MyChart login Username and cannot be changed, so think of one that is secure and easy to remember. 6. Create a [employer] password. You can change your password at any time. 7. Choose a Security Question and enter your Answer and click Next.  Jayceine [place] cost  $35*     SAME DAY APPOINTMENTS   Available at primary care offices    AFTER HOURS CARE  [place]  OFFICE VISIT   Primary Care Providers  Treatment for mild illness or injury that does not require immediate attention.  Average cost  $70*   [facility]